# Patient Record
Sex: FEMALE | Race: WHITE | NOT HISPANIC OR LATINO | Employment: UNEMPLOYED | URBAN - NONMETROPOLITAN AREA
[De-identification: names, ages, dates, MRNs, and addresses within clinical notes are randomized per-mention and may not be internally consistent; named-entity substitution may affect disease eponyms.]

---

## 2018-03-19 ENCOUNTER — TRANSCRIBE ORDERS (OUTPATIENT)
Dept: ADMINISTRATIVE | Facility: HOSPITAL | Age: 38
End: 2018-03-19

## 2018-03-19 DIAGNOSIS — N39.9 DISORDER OF URINARY SYSTEM: Primary | ICD-10-CM

## 2018-05-02 ENCOUNTER — TRANSCRIBE ORDERS (OUTPATIENT)
Dept: ADMINISTRATIVE | Facility: HOSPITAL | Age: 38
End: 2018-05-02

## 2018-05-02 ENCOUNTER — HOSPITAL ENCOUNTER (OUTPATIENT)
Dept: GENERAL RADIOLOGY | Facility: HOSPITAL | Age: 38
Discharge: HOME | End: 2018-05-02
Admitting: NURSE PRACTITIONER

## 2018-05-02 DIAGNOSIS — M54.12 RADICULOPATHY OF CERVICAL SPINE: ICD-10-CM

## 2018-05-02 DIAGNOSIS — M54.12 RADICULOPATHY OF CERVICAL SPINE: Primary | ICD-10-CM

## 2018-05-02 PROCEDURE — 72050 X-RAY EXAM NECK SPINE 4/5VWS: CPT

## 2018-09-17 ENCOUNTER — HOSPITAL ENCOUNTER (OUTPATIENT)
Dept: CARDIOLOGY | Facility: HOSPITAL | Age: 38
Discharge: HOME | End: 2018-09-17
Attending: FAMILY MEDICINE | Admitting: FAMILY MEDICINE

## 2018-09-17 ENCOUNTER — TRANSCRIBE ORDERS (OUTPATIENT)
Dept: ADMINISTRATIVE | Facility: HOSPITAL | Age: 38
End: 2018-09-17

## 2018-09-17 ENCOUNTER — HOSPITAL ENCOUNTER (OUTPATIENT)
Dept: GENERAL RADIOLOGY | Facility: HOSPITAL | Age: 38
Discharge: HOME | End: 2018-09-17
Attending: FAMILY MEDICINE

## 2018-09-17 ENCOUNTER — HOSPITAL ENCOUNTER (OUTPATIENT)
Dept: PREOP | Facility: HOSPITAL | Age: 38
Discharge: HOME | End: 2018-09-17

## 2018-09-17 VITALS
WEIGHT: 128 LBS | DIASTOLIC BLOOD PRESSURE: 69 MMHG | SYSTOLIC BLOOD PRESSURE: 107 MMHG | BODY MASS INDEX: 21.33 KG/M2 | HEIGHT: 65 IN

## 2018-09-17 DIAGNOSIS — R53.83 TIREDNESS: ICD-10-CM

## 2018-09-17 DIAGNOSIS — R00.2 PALPITATIONS: Primary | ICD-10-CM

## 2018-09-17 DIAGNOSIS — R10.30 LOWER ABDOMINAL PAIN: ICD-10-CM

## 2018-09-17 DIAGNOSIS — R00.2 PALPITATIONS: ICD-10-CM

## 2018-09-17 LAB
BH CV ECHO MEAS - AO MAX PG (FULL): 1.8 MMHG
BH CV ECHO MEAS - AO MAX PG: 7 MMHG
BH CV ECHO MEAS - AO MEAN PG (FULL): 1 MMHG
BH CV ECHO MEAS - AO MEAN PG: 4 MMHG
BH CV ECHO MEAS - AO ROOT AREA (BSA CORRECTED): 1.3
BH CV ECHO MEAS - AO ROOT AREA: 3.5 CM^2
BH CV ECHO MEAS - AO ROOT DIAM: 2.1 CM
BH CV ECHO MEAS - AO V2 MAX: 132 CM/SEC
BH CV ECHO MEAS - AO V2 MEAN: 99.1 CM/SEC
BH CV ECHO MEAS - AO V2 VTI: 30 CM
BH CV ECHO MEAS - AVA(I,A): 2.6 CM^2
BH CV ECHO MEAS - AVA(I,D): 2.6 CM^2
BH CV ECHO MEAS - AVA(V,A): 2.7 CM^2
BH CV ECHO MEAS - AVA(V,D): 2.7 CM^2
BH CV ECHO MEAS - BSA(HAYCOCK): 1.6 M^2
BH CV ECHO MEAS - BSA: 1.6 M^2
BH CV ECHO MEAS - BZI_BMI: 21.3 KILOGRAMS/M^2
BH CV ECHO MEAS - BZI_METRIC_HEIGHT: 165.1 CM
BH CV ECHO MEAS - BZI_METRIC_WEIGHT: 58.1 KG
BH CV ECHO MEAS - EDV(CUBED): 96.1 ML
BH CV ECHO MEAS - EDV(MOD-SP4): 55.1 ML
BH CV ECHO MEAS - EDV(TEICH): 96.3 ML
BH CV ECHO MEAS - EF(CUBED): 77.4 %
BH CV ECHO MEAS - EF(MOD-BP): 63 %
BH CV ECHO MEAS - EF(MOD-SP4): 63.2 %
BH CV ECHO MEAS - EF(TEICH): 69.6 %
BH CV ECHO MEAS - ESV(CUBED): 21.7 ML
BH CV ECHO MEAS - ESV(MOD-SP4): 20.3 ML
BH CV ECHO MEAS - ESV(TEICH): 29.3 ML
BH CV ECHO MEAS - FS: 39.1 %
BH CV ECHO MEAS - IVS/LVPW: 0.93
BH CV ECHO MEAS - IVSD: 0.63 CM
BH CV ECHO MEAS - LA DIMENSION: 2.9 CM
BH CV ECHO MEAS - LA/AO: 1.4
BH CV ECHO MEAS - LAT PEAK E' VEL: 14.6 CM/SEC
BH CV ECHO MEAS - LV DIASTOLIC VOL/BSA (35-75): 33.7 ML/M^2
BH CV ECHO MEAS - LV MASS(C)D: 90.2 GRAMS
BH CV ECHO MEAS - LV MASS(C)DI: 55.1 GRAMS/M^2
BH CV ECHO MEAS - LV MAX PG: 5.2 MMHG
BH CV ECHO MEAS - LV MEAN PG: 3 MMHG
BH CV ECHO MEAS - LV SYSTOLIC VOL/BSA (12-30): 12.4 ML/M^2
BH CV ECHO MEAS - LV V1 MAX: 114 CM/SEC
BH CV ECHO MEAS - LV V1 MEAN: 77.3 CM/SEC
BH CV ECHO MEAS - LV V1 VTI: 25 CM
BH CV ECHO MEAS - LVIDD: 4.6 CM
BH CV ECHO MEAS - LVIDS: 2.8 CM
BH CV ECHO MEAS - LVLD AP4: 7.4 CM
BH CV ECHO MEAS - LVLS AP4: 5.6 CM
BH CV ECHO MEAS - LVOT AREA (M): 3.1 CM^2
BH CV ECHO MEAS - LVOT AREA: 3.1 CM^2
BH CV ECHO MEAS - LVOT DIAM: 2 CM
BH CV ECHO MEAS - LVPWD: 0.67 CM
BH CV ECHO MEAS - MED PEAK E' VEL: 13.9 CM/SEC
BH CV ECHO MEAS - MV A MAX VEL: 38.8 CM/SEC
BH CV ECHO MEAS - MV DEC TIME: 0.25 SEC
BH CV ECHO MEAS - MV E MAX VEL: 82 CM/SEC
BH CV ECHO MEAS - MV E/A: 2.1
BH CV ECHO MEAS - PA MAX PG: 3.1 MMHG
BH CV ECHO MEAS - PA V2 MAX: 88.3 CM/SEC
BH CV ECHO MEAS - RAP SYSTOLE: 5 MMHG
BH CV ECHO MEAS - RVSP: 24.5 MMHG
BH CV ECHO MEAS - SI(AO): 63.5 ML/M^2
BH CV ECHO MEAS - SI(CUBED): 45.4 ML/M^2
BH CV ECHO MEAS - SI(LVOT): 48 ML/M^2
BH CV ECHO MEAS - SI(MOD-SP4): 21.3 ML/M^2
BH CV ECHO MEAS - SI(TEICH): 41 ML/M^2
BH CV ECHO MEAS - SV(AO): 103.9 ML
BH CV ECHO MEAS - SV(CUBED): 74.4 ML
BH CV ECHO MEAS - SV(LVOT): 78.5 ML
BH CV ECHO MEAS - SV(MOD-SP4): 34.8 ML
BH CV ECHO MEAS - SV(TEICH): 67.1 ML
BH CV ECHO MEAS - TR MAX VEL: 221 CM/SEC
BH CV ECHO MEASUREMENTS AVERAGE E/E' RATIO: 5.75
LEFT ATRIUM VOLUME INDEX: 15.5 ML/M2
LEFT ATRIUM VOLUME: 25.5 CM3
MAXIMAL PREDICTED HEART RATE: 182 BPM
STRESS TARGET HR: 155 BPM

## 2018-09-17 PROCEDURE — 51798 US URINE CAPACITY MEASURE: CPT

## 2018-09-17 PROCEDURE — 93306 TTE W/DOPPLER COMPLETE: CPT

## 2018-09-17 PROCEDURE — 71046 X-RAY EXAM CHEST 2 VIEWS: CPT

## 2018-09-17 PROCEDURE — 93306 TTE W/DOPPLER COMPLETE: CPT | Performed by: INTERNAL MEDICINE

## 2019-02-11 ENCOUNTER — TRANSCRIBE ORDERS (OUTPATIENT)
Dept: ADMINISTRATIVE | Facility: HOSPITAL | Age: 39
End: 2019-02-11

## 2019-02-11 ENCOUNTER — HOSPITAL ENCOUNTER (OUTPATIENT)
Dept: ULTRASOUND IMAGING | Facility: HOSPITAL | Age: 39
Discharge: HOME | End: 2019-02-11
Admitting: PHYSICIAN ASSISTANT

## 2019-02-11 DIAGNOSIS — M79.605 PAIN IN LEFT LEG: ICD-10-CM

## 2019-02-11 DIAGNOSIS — R22.9 LOCALIZED SWELLING, MASS AND LUMP, UNSPECIFIED: ICD-10-CM

## 2019-02-11 DIAGNOSIS — R22.9 LOCAL SUPERFICIAL SWELLING, MASS OR LUMP: ICD-10-CM

## 2019-02-11 DIAGNOSIS — R22.9 LOCALIZED SWELLING, MASS AND LUMP, UNSPECIFIED: Primary | ICD-10-CM

## 2019-02-11 PROCEDURE — 93971 EXTREMITY STUDY: CPT

## 2019-08-22 ENCOUNTER — TRANSCRIBE ORDERS (OUTPATIENT)
Dept: ADMINISTRATIVE | Facility: HOSPITAL | Age: 39
End: 2019-08-22

## 2019-08-22 ENCOUNTER — HOSPITAL ENCOUNTER (OUTPATIENT)
Dept: ULTRASOUND IMAGING | Facility: HOSPITAL | Age: 39
Discharge: HOME | End: 2019-08-22
Admitting: PHYSICIAN ASSISTANT

## 2019-08-22 DIAGNOSIS — R11.0 NAUSEA: ICD-10-CM

## 2019-08-22 DIAGNOSIS — R10.10 UPPER ABDOMINAL PAIN: Primary | ICD-10-CM

## 2019-08-22 DIAGNOSIS — R10.10 UPPER ABDOMINAL PAIN: ICD-10-CM

## 2019-08-22 PROCEDURE — 76705 ECHO EXAM OF ABDOMEN: CPT

## 2019-10-24 ENCOUNTER — TRANSCRIBE ORDERS (OUTPATIENT)
Dept: ADMINISTRATIVE | Facility: HOSPITAL | Age: 39
End: 2019-10-24

## 2019-10-24 DIAGNOSIS — M54.2 CERVICALGIA: Primary | ICD-10-CM

## 2019-10-31 ENCOUNTER — HOSPITAL ENCOUNTER (OUTPATIENT)
Dept: MRI IMAGING | Facility: HOSPITAL | Age: 39
Discharge: HOME | End: 2019-10-31
Admitting: NURSE PRACTITIONER

## 2019-10-31 DIAGNOSIS — M54.2 CERVICALGIA: ICD-10-CM

## 2019-10-31 PROCEDURE — 72141 MRI NECK SPINE W/O DYE: CPT

## 2019-11-12 ENCOUNTER — HOSPITAL ENCOUNTER (OUTPATIENT)
Age: 39
Setting detail: OUTPATIENT SURGERY
Discharge: HOME | End: 2019-11-12
Attending: PHYSICAL MEDICINE & REHABILITATION | Admitting: PHYSICAL MEDICINE & REHABILITATION

## 2019-11-12 ENCOUNTER — HOSPITAL ENCOUNTER (OUTPATIENT)
Dept: GENERAL RADIOLOGY | Age: 39
Discharge: HOME | End: 2019-11-12

## 2019-11-12 VITALS
DIASTOLIC BLOOD PRESSURE: 62 MMHG | SYSTOLIC BLOOD PRESSURE: 100 MMHG | RESPIRATION RATE: 16 BRPM | OXYGEN SATURATION: 98 % | HEART RATE: 78 BPM

## 2019-11-12 DIAGNOSIS — L90.5 SCAR PAINFUL: ICD-10-CM

## 2019-11-12 DIAGNOSIS — R52 SCAR PAINFUL: ICD-10-CM

## 2019-11-12 PROCEDURE — 62321 NJX INTERLAMINAR CRV/THRC: CPT

## 2019-11-12 PROCEDURE — G8918 PT W/O PREOP ORDER IV AB PRO: HCPCS

## 2019-11-12 PROCEDURE — G8907 PT DOC NO EVENTS ON DISCHARG: HCPCS

## 2019-11-12 PROCEDURE — 3209999900 FLUORO FOR SURGICAL PROCEDURES

## 2019-11-12 RX ORDER — METHYLPREDNISOLONE ACETATE 80 MG/ML
INJECTION, SUSPENSION INTRA-ARTICULAR; INTRALESIONAL; INTRAMUSCULAR; SOFT TISSUE PRN
Status: DISCONTINUED | OUTPATIENT
Start: 2019-11-12 | End: 2019-11-12 | Stop reason: ALTCHOICE

## 2019-11-12 RX ORDER — SODIUM CHLORIDE 9 MG/ML
INJECTION INTRAVENOUS PRN
Status: DISCONTINUED | OUTPATIENT
Start: 2019-11-12 | End: 2019-11-12 | Stop reason: ALTCHOICE

## 2019-11-12 RX ORDER — CYANOCOBALAMIN 1000 UG/ML
1000 INJECTION INTRAMUSCULAR; SUBCUTANEOUS
COMMUNITY

## 2019-11-12 RX ORDER — LIDOCAINE HYDROCHLORIDE 10 MG/ML
INJECTION, SOLUTION INFILTRATION; PERINEURAL PRN
Status: DISCONTINUED | OUTPATIENT
Start: 2019-11-12 | End: 2019-11-12 | Stop reason: ALTCHOICE

## 2019-11-12 RX ORDER — BUTALBITAL, ACETAMINOPHEN AND CAFFEINE 50; 325; 40 MG/1; MG/1; MG/1
1 TABLET ORAL EVERY 4 HOURS PRN
COMMUNITY
End: 2020-05-27 | Stop reason: ALTCHOICE

## 2019-11-12 ASSESSMENT — PAIN SCALES - GENERAL
PAINLEVEL_OUTOF10: 0
PAINLEVEL_OUTOF10: 0

## 2019-12-13 ENCOUNTER — HOSPITAL ENCOUNTER (OUTPATIENT)
Dept: NON INVASIVE DIAGNOSTICS | Age: 39
Discharge: HOME | End: 2019-12-13
Payer: COMMERCIAL

## 2019-12-13 PROCEDURE — 93229 REMOTE 30 DAY ECG TECH SUPP: CPT

## 2019-12-17 ENCOUNTER — HOSPITAL ENCOUNTER (OUTPATIENT)
Dept: GENERAL RADIOLOGY | Age: 39
Discharge: HOME | End: 2019-12-17
Payer: COMMERCIAL

## 2019-12-17 ENCOUNTER — HOSPITAL ENCOUNTER (OUTPATIENT)
Age: 39
Setting detail: OUTPATIENT SURGERY
Discharge: HOME | End: 2019-12-17
Attending: PHYSICAL MEDICINE & REHABILITATION | Admitting: PHYSICAL MEDICINE & REHABILITATION

## 2019-12-17 VITALS
HEART RATE: 74 BPM | DIASTOLIC BLOOD PRESSURE: 69 MMHG | RESPIRATION RATE: 20 BRPM | SYSTOLIC BLOOD PRESSURE: 103 MMHG | OXYGEN SATURATION: 97 %

## 2019-12-17 DIAGNOSIS — M54.2 NECK PAIN: ICD-10-CM

## 2019-12-17 PROCEDURE — 3209999900 FLUORO FOR SURGICAL PROCEDURES

## 2019-12-17 PROCEDURE — 62321 NJX INTERLAMINAR CRV/THRC: CPT

## 2019-12-17 RX ORDER — SODIUM CHLORIDE 9 MG/ML
INJECTION INTRAVENOUS PRN
Status: DISCONTINUED | OUTPATIENT
Start: 2019-12-17 | End: 2019-12-17 | Stop reason: ALTCHOICE

## 2019-12-17 RX ORDER — LIDOCAINE HYDROCHLORIDE 10 MG/ML
INJECTION, SOLUTION INFILTRATION; PERINEURAL PRN
Status: DISCONTINUED | OUTPATIENT
Start: 2019-12-17 | End: 2019-12-17 | Stop reason: ALTCHOICE

## 2019-12-17 RX ORDER — METHYLPREDNISOLONE ACETATE 80 MG/ML
INJECTION, SUSPENSION INTRA-ARTICULAR; INTRALESIONAL; INTRAMUSCULAR; SOFT TISSUE PRN
Status: DISCONTINUED | OUTPATIENT
Start: 2019-12-17 | End: 2019-12-17 | Stop reason: ALTCHOICE

## 2020-03-17 ENCOUNTER — HOSPITAL ENCOUNTER (OUTPATIENT)
Dept: GENERAL RADIOLOGY | Age: 40
Discharge: HOME | End: 2020-03-17
Payer: COMMERCIAL

## 2020-03-17 ENCOUNTER — HOSPITAL ENCOUNTER (OUTPATIENT)
Age: 40
Setting detail: OUTPATIENT SURGERY
Discharge: HOME | End: 2020-03-17
Attending: PHYSICAL MEDICINE & REHABILITATION | Admitting: PHYSICAL MEDICINE & REHABILITATION

## 2020-03-17 VITALS
OXYGEN SATURATION: 98 % | DIASTOLIC BLOOD PRESSURE: 61 MMHG | RESPIRATION RATE: 20 BRPM | HEART RATE: 68 BPM | SYSTOLIC BLOOD PRESSURE: 99 MMHG

## 2020-03-17 PROCEDURE — 3209999900 FLUORO FOR SURGICAL PROCEDURES

## 2020-03-17 PROCEDURE — 62321 NJX INTERLAMINAR CRV/THRC: CPT

## 2020-03-17 RX ORDER — METHYLPREDNISOLONE ACETATE 80 MG/ML
INJECTION, SUSPENSION INTRA-ARTICULAR; INTRALESIONAL; INTRAMUSCULAR; SOFT TISSUE PRN
Status: DISCONTINUED | OUTPATIENT
Start: 2020-03-17 | End: 2020-03-17 | Stop reason: ALTCHOICE

## 2020-03-17 RX ORDER — SODIUM CHLORIDE 9 MG/ML
INJECTION INTRAVENOUS PRN
Status: DISCONTINUED | OUTPATIENT
Start: 2020-03-17 | End: 2020-03-17 | Stop reason: ALTCHOICE

## 2020-03-17 RX ORDER — LIDOCAINE HYDROCHLORIDE 10 MG/ML
INJECTION, SOLUTION INFILTRATION; PERINEURAL PRN
Status: DISCONTINUED | OUTPATIENT
Start: 2020-03-17 | End: 2020-03-17 | Stop reason: ALTCHOICE

## 2020-03-17 NOTE — INTERVAL H&P NOTE
H&P Update     Patient examined. There has been no change.     Electronically signed by Keke Rivera on 3/17/20 at 9:34 AM CDT

## 2020-05-27 ENCOUNTER — OFFICE VISIT (OUTPATIENT)
Dept: NEUROSURGERY | Age: 40
End: 2020-05-27
Payer: COMMERCIAL

## 2020-05-27 VITALS
SYSTOLIC BLOOD PRESSURE: 103 MMHG | HEIGHT: 65 IN | HEART RATE: 91 BPM | BODY MASS INDEX: 21.66 KG/M2 | DIASTOLIC BLOOD PRESSURE: 63 MMHG | WEIGHT: 130 LBS

## 2020-05-27 PROCEDURE — 99204 OFFICE O/P NEW MOD 45 MIN: CPT | Performed by: NURSE PRACTITIONER

## 2020-05-27 RX ORDER — RIZATRIPTAN BENZOATE 10 MG/1
TABLET, ORALLY DISINTEGRATING ORAL
COMMUNITY
Start: 2020-05-05

## 2020-05-27 RX ORDER — NORETHINDRONE ACETATE AND ETHINYL ESTRADIOL 1MG-20(21)
1 KIT ORAL DAILY
COMMUNITY

## 2020-05-27 RX ORDER — TIZANIDINE 4 MG/1
TABLET ORAL
COMMUNITY
Start: 2020-05-05

## 2020-05-27 NOTE — PROGRESS NOTES
[facility] Neurology Office Note      Patient:   Anabell Woodall  MR#:    [id number]  Account Number:                         YOB: 1980  Date of Evaluation:  5/27/2020  Time of Note:                          3:03 PM  Primary/Referring Physician:  Rita Ewing DO   Consulting Physician:  PRAMOD Olivares    NEW PATIENT CONSULTATION    Chief Complaint   Patient presents with    New Patient     Referred by Umang Agee for neck pain and headaches. Patient states it has been going on for several years. HISTORY OF PRESENT ILLNESS    Anabell Woodall is a 44y.o. year old female here for evaluation of headaches and neck pain. Headache pain is posterior with radiation forward/retro orbital. Primarily right sided pain. She does note quite a bit of right shoulder pain as well with headaches. Prior to headache she notes that her right eye feels dry and closes her eye more slowly. She denies visual changes or visual loss. She notes light/sound sensitivity with headaches, some mild nausea noted as well. She does note worsening headaches around her menstrual cycle. If she takes Maxalt early on in the headache this will typically alleviate the pain. She has tried Fioricet with mild improvement. Applying ice to the neck will help some headaches. No prior preventatives. She is noting 5-8 migraines in a month. She has a long history of neck pain, at times pain will radiate into the right arm. No overt weakness. She follows with Dr. Terell Munoz as well and has tried ONBs, TPIs without much improvement. She has had 2 ESIs with Dr. Terell Munoz as well. She did note some improvement after SELIN injections. She has tried physical therapy for neck pain several years ago and this was beneficial at that time. No other complaints today.      Past Medical History:   Diagnosis Date    Headache     Hyperlipidemia     Mitral valve prolapse        Past Surgical History:   Procedure Laterality Date    Sig Dispense Refill    norethindrone-ethinyl estradiol (JUNEL FE 1/20) 1-20 MG-MCG per tablet Take 1 tablet by mouth daily      rizatriptan (MAXALT-MLT) 10 MG disintegrating tablet       tiZANidine (ZANAFLEX) 4 MG tablet       diclofenac sodium (VOLTAREN) 1 % GEL       Levothyroxine Sodium (SYNTHROID PO) Take by mouth      cyanocobalamin 1000 MCG/ML injection Inject 1,000 mcg into the muscle every 30 days       ALPRAZolam (XANAX PO) Take 0.25 mg by mouth as needed       busPIRone HCl (BUSPAR PO) Take 10 mg by mouth 3 times daily       Metaxalone (SKELAXIN PO) Take by mouth daily        No current facility-administered medications for this visit. No Known Allergies    REVIEW OF SYSTEMS  Constitutional: []? Fever []? Sweat []? Chills []? Recent Injury [x]? Denies all unless marked  HEENT:[]? Headache  []? Head Injury/Hearing Loss  []? Sore Throat  []? Ear Ache/Dizziness  [x]? Denies all unless marked  Spine:  []? Neck pain  []? Back pain  []? Sciaticia  [x]? Denies all unless marked  Cardiovascular:[]? Heart Disease []? Chest Pain []? Palpitations  [x]? Denies all unless marked  Pulmonary: []? Shortness of Breath []? Cough   [x]? Denies all unless marke  Gastrointestinal: []? Nausea  []? Vomiting  []? Abdominal Pain  []? Constipation  []? Diarrhea  []? Dark Bloody Stools  [x]? Denies all unless marked  Psychiatric/Behavioral:[]? Depression []? Anxiety [x]? Denies all unless marked  Genitourinary:   []? Frequency  []? Urgency  []? Incontinence []? Pain with Urination  [x]? Denies all unless marked  Extremities: []? Pain  []? Swelling  [x]? Denies all unless marked  Musculoskeletal: []? Muscle Pain  []? Joint Pain  []? Arthritis []? Muscle Cramps []? Muscle Twitches  [x]? Denies all unless marked  Sleep: []? Insomnia []? Snoring []? Restless Legs []? Sleep Apnea  []? Daytime Sleepiness  [x]? Denies all unless marked  Skin:[]? Rash []? Skin Discoloration [x]? Denies all unless marked   Neurological: []? Visual

## 2020-06-15 ENCOUNTER — HOSPITAL ENCOUNTER (OUTPATIENT)
Dept: MRI IMAGING | Age: 40
Discharge: HOME | End: 2020-06-15
Payer: COMMERCIAL

## 2020-06-15 PROCEDURE — A9577 INJ MULTIHANCE: HCPCS | Performed by: NURSE PRACTITIONER

## 2020-06-15 PROCEDURE — 70553 MRI BRAIN STEM W/O & W/DYE: CPT

## 2020-06-15 PROCEDURE — 6360000004 HC RX CONTRAST MEDICATION: Performed by: NURSE PRACTITIONER

## 2020-06-15 RX ADMIN — GADOBENATE DIMEGLUMINE 10 ML: 529 INJECTION, SOLUTION INTRAVENOUS at 08:56

## 2020-06-22 ENCOUNTER — TELEPHONE (OUTPATIENT)
Dept: NEUROSURGERY | Age: 40
End: 2020-06-22

## 2020-10-06 ENCOUNTER — HOSPITAL ENCOUNTER (OUTPATIENT)
Dept: PAIN MANAGEMENT | Age: 40
Discharge: HOME | End: 2020-10-06
Payer: COMMERCIAL

## 2020-10-06 VITALS
TEMPERATURE: 97.1 F | OXYGEN SATURATION: 98 % | RESPIRATION RATE: 18 BRPM | DIASTOLIC BLOOD PRESSURE: 65 MMHG | SYSTOLIC BLOOD PRESSURE: 93 MMHG | HEART RATE: 57 BPM

## 2020-10-06 PROCEDURE — 6360000002 HC RX W HCPCS

## 2020-10-06 PROCEDURE — 2580000003 HC RX 258

## 2020-10-06 PROCEDURE — 62321 NJX INTERLAMINAR CRV/THRC: CPT

## 2020-10-06 PROCEDURE — 2500000003 HC RX 250 WO HCPCS

## 2020-10-06 PROCEDURE — 3209999900 FLUORO FOR SURGICAL PROCEDURES

## 2020-10-06 RX ORDER — SODIUM CHLORIDE 9 MG/ML
INJECTION INTRAVENOUS
Status: COMPLETED | OUTPATIENT
Start: 2020-10-06 | End: 2020-10-06

## 2020-10-06 RX ORDER — METHYLPREDNISOLONE ACETATE 80 MG/ML
INJECTION, SUSPENSION INTRA-ARTICULAR; INTRALESIONAL; INTRAMUSCULAR; SOFT TISSUE
Status: COMPLETED | OUTPATIENT
Start: 2020-10-06 | End: 2020-10-06

## 2020-10-06 RX ORDER — LIDOCAINE HYDROCHLORIDE 10 MG/ML
INJECTION, SOLUTION EPIDURAL; INFILTRATION; INTRACAUDAL; PERINEURAL
Status: COMPLETED | OUTPATIENT
Start: 2020-10-06 | End: 2020-10-06

## 2020-10-06 RX ADMIN — SODIUM CHLORIDE 5 ML: 9 INJECTION INTRAVENOUS at 09:03

## 2020-10-06 RX ADMIN — METHYLPREDNISOLONE ACETATE 80 MG: 80 INJECTION, SUSPENSION INTRA-ARTICULAR; INTRALESIONAL; INTRAMUSCULAR; SOFT TISSUE at 09:03

## 2020-10-06 RX ADMIN — LIDOCAINE HYDROCHLORIDE 5 ML: 10 INJECTION, SOLUTION EPIDURAL; INFILTRATION; INTRACAUDAL; PERINEURAL at 09:02

## 2020-10-06 ASSESSMENT — PAIN DESCRIPTION - ONSET: ONSET: AWAKENED FROM SLEEP

## 2020-10-06 ASSESSMENT — PAIN DESCRIPTION - DIRECTION: RADIATING_TOWARDS: RUE

## 2020-10-06 ASSESSMENT — PAIN DESCRIPTION - PAIN TYPE: TYPE: CHRONIC PAIN

## 2020-10-06 ASSESSMENT — PAIN DESCRIPTION - DESCRIPTORS: DESCRIPTORS: BURNING;RADIATING

## 2020-10-06 ASSESSMENT — PAIN - FUNCTIONAL ASSESSMENT: PAIN_FUNCTIONAL_ASSESSMENT: PREVENTS OR INTERFERES SOME ACTIVE ACTIVITIES AND ADLS

## 2020-10-06 ASSESSMENT — PAIN DESCRIPTION - PROGRESSION: CLINICAL_PROGRESSION: GRADUALLY WORSENING

## 2020-10-06 ASSESSMENT — PAIN DESCRIPTION - ORIENTATION: ORIENTATION: RIGHT

## 2020-10-06 ASSESSMENT — PAIN SCALES - GENERAL: PAINLEVEL_OUTOF10: 4

## 2020-10-06 ASSESSMENT — PAIN DESCRIPTION - LOCATION: LOCATION: NECK;SHOULDER;ARM

## 2020-10-06 ASSESSMENT — PAIN DESCRIPTION - FREQUENCY: FREQUENCY: CONTINUOUS

## 2020-10-06 NOTE — INTERVAL H&P NOTE
Update History & Physical    The patient's History and Physical was reviewed with the patient and I examined the patient. There was  NO CHANGE:[id number]}. The surgical site was confirmed by the patient and me. Plan: The risks, benefits, expected outcome, and alternative to the recommended procedure have been discussed with the patient. Patient understands and wants to proceed with the procedure.      Electronically signed by Seth Mak MD on 10/6/2020 at 8:59 AM

## 2021-11-12 ENCOUNTER — TRANSCRIBE ORDERS (OUTPATIENT)
Dept: ADMINISTRATIVE | Facility: HOSPITAL | Age: 41
End: 2021-11-12

## 2021-11-12 ENCOUNTER — HOSPITAL ENCOUNTER (OUTPATIENT)
Dept: ULTRASOUND IMAGING | Facility: HOSPITAL | Age: 41
Discharge: HOME | End: 2021-11-12
Admitting: FAMILY MEDICINE

## 2021-11-12 DIAGNOSIS — E03.9 HYPOTHYROIDISM, UNSPECIFIED TYPE: ICD-10-CM

## 2021-11-12 DIAGNOSIS — R94.6 ABNORMAL RESULTS OF THYROID FUNCTION STUDIES: Primary | ICD-10-CM

## 2021-11-12 PROCEDURE — 76536 US EXAM OF HEAD AND NECK: CPT

## 2021-12-05 PROCEDURE — 87635 SARS-COV-2 COVID-19 AMP PRB: CPT | Performed by: NURSE PRACTITIONER

## 2022-10-26 ENCOUNTER — TRANSCRIBE ORDERS (OUTPATIENT)
Dept: ADMINISTRATIVE | Facility: HOSPITAL | Age: 42
End: 2022-10-26

## 2022-10-26 DIAGNOSIS — I34.89 OTHER NONRHEUMATIC MITRAL VALVE DISORDERS: Primary | ICD-10-CM

## 2022-10-26 DIAGNOSIS — Q79.60 EHLERS-DANLOS SYNDROME, UNSPECIFIED: ICD-10-CM

## 2022-11-15 ENCOUNTER — TELEPHONE (OUTPATIENT)
Dept: NEUROSURGERY | Age: 42
End: 2022-11-15

## 2022-11-15 DIAGNOSIS — M54.2 NECK PAIN: Primary | ICD-10-CM

## 2022-11-15 NOTE — TELEPHONE ENCOUNTER
[facility] New Patient Questionnaire    Diagnosis/Reason for Referral?  M54.2 (ICD-10-CM) - Cervicalgia   M54.12 (ICD-10-CM) - Radiculopathy, cervical region          2. Who is completing questionnaire? Patient X Caregiver Family      3. Has the patient had any previous spinal/brain surgeries? NO      A. If yes, what is the name of the facility in which the surgery was performed? B. Procedure/Surgery performed? C. Who was the surgeon? D. When was the surgery? MM/YY       E. Did the patient improve after the surgery? 4. Is this a second opinion? If yes, Dr. Wanda Herring would like to review patient first before making the appointment. 5. Have MRI Images been obtain within the last year? Yes   No X INSTRUCTED TO COMPLETE XR      XR  CT     If yes, where was the imaging performed? If yes, what part of the body? Lumbar  Cervical  Thoracic  Brain     If yes, when was it obtained? MM/YY    Note: if the scan was performed at a facility other than [address] [place] [address], the disc will need to be brought to the appointment or we need to reach out to obtain the disc. A. Was the patient instructed to provide the disc? Yes   No X      8. Has the patient had a NCV/EMG within the last year? Yes  No X     If yes, where was it performed and date? MM/YY  Location:      9. Has the patient been to Physical Therapy? Yes X  No     If yes, what location, how long attended, and last visit? Location: [facility]       Therapy Lasted:    Date of Last Visit: 2021      10. Has the patient been to Pain Management? Yes X No  DRY NEEDLING     If yes, what location and last visit     Location:    Last Visit:   Is it helping?

## 2022-11-23 ENCOUNTER — HOSPITAL ENCOUNTER (OUTPATIENT)
Dept: GENERAL RADIOLOGY | Age: 42
Discharge: HOME | End: 2022-11-23
Payer: COMMERCIAL

## 2022-11-23 ENCOUNTER — OFFICE VISIT (OUTPATIENT)
Dept: NEUROSURGERY | Age: 42
End: 2022-11-23
Payer: COMMERCIAL

## 2022-11-23 VITALS
DIASTOLIC BLOOD PRESSURE: 72 MMHG | RESPIRATION RATE: 18 BRPM | SYSTOLIC BLOOD PRESSURE: 102 MMHG | WEIGHT: 150 LBS | HEART RATE: 92 BPM | HEIGHT: 65 IN | BODY MASS INDEX: 24.99 KG/M2 | OXYGEN SATURATION: 98 %

## 2022-11-23 DIAGNOSIS — R20.0 HAND NUMBNESS: ICD-10-CM

## 2022-11-23 DIAGNOSIS — M79.602 BILATERAL ARM PAIN: ICD-10-CM

## 2022-11-23 DIAGNOSIS — M54.2 NECK PAIN: ICD-10-CM

## 2022-11-23 DIAGNOSIS — M50.30 DDD (DEGENERATIVE DISC DISEASE), CERVICAL: Primary | ICD-10-CM

## 2022-11-23 DIAGNOSIS — M40.292 OTHER KYPHOSIS OF CERVICAL REGION: ICD-10-CM

## 2022-11-23 DIAGNOSIS — M79.601 BILATERAL ARM PAIN: ICD-10-CM

## 2022-11-23 PROCEDURE — 72040 X-RAY EXAM NECK SPINE 2-3 VW: CPT | Performed by: RADIOLOGY

## 2022-11-23 PROCEDURE — 72040 X-RAY EXAM NECK SPINE 2-3 VW: CPT

## 2022-11-23 PROCEDURE — 99204 OFFICE O/P NEW MOD 45 MIN: CPT | Performed by: NURSE PRACTITIONER

## 2022-11-23 RX ORDER — DICLOFENAC SODIUM 75 MG/1
75 TABLET, DELAYED RELEASE ORAL 2 TIMES DAILY
COMMUNITY
Start: 2022-11-14

## 2022-11-23 ASSESSMENT — ENCOUNTER SYMPTOMS
EYES NEGATIVE: 1
RESPIRATORY NEGATIVE: 1
GASTROINTESTINAL NEGATIVE: 1

## 2022-11-23 NOTE — PROGRESS NOTES
[facility]  Office Visit      Chief Complaint   Patient presents with    New Patient     Establishing care    Results     XR Cervical 11/23/22    Neck Pain     Patient states \"I have had pain for years and it is gradually worsening. \" She states she does have bulging discs in her neck. She has tried PT and dry needling which helped her for awhile. She states she has had a recent flare up which is causing her problems again. She is taking Diclofenac and Tizanidine to help manage the pain. Numbness     Patient states she does have numbness/tingling in her BUE but mainly in the right side. She states she also has a burning sensation. HISTORY OF PRESENT ILLNESS:    Mark Cortes is a 43 y.o. female who presents with neck pain and BUE numbness and paresthesias that has been present for many years, however, over the last 3 months has worsened. The pain does radiate into the BUE, R>L and the pain travels into bicep deltoid and into the forearm. She describes it as burning sensations. She also has interscapular pain. Her pain is mostly located in the arms. The patient complains of numbness of the right 4th and 5th digits. She does not have numbness in the fingertips, trouble using hands to perform fine motor tasks or ataxia. Pain will worsen with lifting or physical activity using her arms.   She has a history of significant headaches in which she did see Dr. Idania Andrea for in the past.      The patient has underwent a non-operative treatment course that has included:  NSAIDs (ibuprofen, diclofenac)  Tylenol  Muscle Relaxers (skelaxin, tizanidine)  Opiates  Oral Steroids  Physical Therapy ([place] many sessions over the last 2 years, still going)  Manual Traction  Epidural Steroid Injections (Dr. Idania Andrea)  Trigger Point  Dry needling with PT  Chiropractic Manipulation (years ago)  TENS Unit with PT dry needling made her worse      Of note she does use tobacco and does take blood thinning medications (Excedrine and ibuprfen). Past Medical History:   Diagnosis Date    Headache     Hyperlipidemia     Mitral valve prolapse        Past Surgical History:   Procedure Laterality Date    CERVICAL SPINE SURGERY N/A 11/12/2019    CERVICAL INTERLAMINAR SELIN C5-6 performed by Rita Vidal at [address] N/A 3/17/2020    CERVICAL INTERLAMINAR SELIN C6-7 performed by Ben Valdez at [address] N/A 12/17/2019    EPIDURAL STEROID INJECTION C6-7 performed by Ben Valdez at [address] [facility]      reconstruction, has metal in face     SINUS SURGERY         Current Outpatient Medications   Medication Sig Dispense Refill    diclofenac (VOLTAREN) 75 MG EC tablet Take 75 mg by mouth in the morning and at bedtime      norethindrone-ethinyl estradiol (JUNEL FE 1/20) 1-20 MG-MCG per tablet Take 1 tablet by mouth daily      rizatriptan (MAXALT-MLT) 10 MG disintegrating tablet       tiZANidine (ZANAFLEX) 4 MG tablet       diclofenac sodium (VOLTAREN) 1 % GEL       Levothyroxine Sodium (SYNTHROID PO) Take by mouth      cyanocobalamin 1000 MCG/ML injection Inject 1,000 mcg into the muscle every 30 days       ALPRAZolam (XANAX PO) Take 0.25 mg by mouth as needed       busPIRone HCl (BUSPAR PO) Take 10 mg by mouth 3 times daily        No current facility-administered medications for this visit. Allergies:  Fish allergy    Social History:   Social History     Tobacco Use   Smoking Status Every Day    Packs/day: 1.00    Types: Cigarettes   Smokeless Tobacco Never     Social History     Substance and Sexual Activity   Alcohol Use None    Comment: very rare          Family History:   No family history on file. REVIEW OF SYSTEMS:  Constitutional: Negative. HENT: Negative. Eyes: Negative. Respiratory: Negative. Cardiovascular: Negative. Gastrointestinal: Negative. Genitourinary: Negative.     Musculoskeletal:  Positive for joint pain, myalgias and neck pain. Skin: Negative. Neurological:  Positive for tingling and weakness. Endo/Heme/Allergies: Negative. Psychiatric/Behavioral: Negative. PHYSICAL EXAM:  Vitals:    11/23/22 1403   BP: 102/72   Pulse: 92   Resp: 18   SpO2: 98%     Constitutional: appears well-developed and well-nourished. Eyes - conjunctiva normal.  Pupils react to light  Ear, nose, throat - hearing intact to finger rub, No scars, masses, or lesions over external nose or ears, no atrophy oftongue  Neck- symmetric, no masses noted, no jugular vein distension  Respiration- chest wall appears symmetric, good expansion, normal effort without use of accessory muscles  Musculoskeletal - no significant wasting of muscles noted, no bony deformities, gait no gross ataxia  Extremities- no clubbing, cyanosis oredema  Skin - warm, dry, and intact. No rash, erythema, or pallor. Psychiatric - mood, affect, and behavior appear normal.     Neurologic Examination  Awake, Alert and oriented x 4  Normal speech pattern, following commands    Motor:  RIGHT: hand grasp 5/5    finger extension 5/5    bicep 5/5    triceps 5/5    deltoid 5/5      LEFT:   hand grasp 5/5    finger extension 5/5    bicep 5/5    triceps 5/5    deltoid 5/5      No deficits to light touch or pinprick sensation  Reflexes are 2+ and symmetric  No clonus or Hoffmans sign  No myofacial tenderness to palpation  Normal Gait pattern        DATA and IMAGING:    Nursing/pcp notes, imaging, labs, and vitals reviewed.      PT,OT and/or speech notes reviewed    No results found for: WBC, HGB, HCT, MCV, PLTNo results found for: NA, K, CL, CO2, BUN, CREATININE, GLUCOSE, CALCIUM, PROT, LABALBU, BILITOT, ALKPHOS, AST, ALT, LABGLOM, GFRAA, AGRATIO, GLOBNo results found for: INR, PROTIME        XR Cervical Spine (11/23/2022) [facility]  I have personally reviewed these images and my interpretation is:  Mild DDD throughout, worse at C5-6, there is straightening of the cervical spine with  a very mild kyphosis at C5-6. ASSESSMENT:    Jessica Lassiter is a 43 y.o. female with complaints of chronic neck pain, BUE numbness and paresthesias. ICD-10-CM    1. DDD (degenerative disc disease), cervical  M50.30 MRI CERVICAL SPINE WO CONTRAST      2. Other kyphosis of cervical region  M40.292 MRI CERVICAL SPINE WO CONTRAST      3. Neck pain  M54.2 MRI CERVICAL SPINE WO CONTRAST      4. Bilateral arm pain  M79.601 MRI CERVICAL SPINE WO CONTRAST    M79.602       5. Hand numbness  R20.0 MRI CERVICAL SPINE WO CONTRAST          PLAN:  I have discussed and reviewed the results of the XR cervical spine with Mrs. [facility] at length. I explained that she does have significant straightening of the cervical spine along with some DDD mostly at C5-6. I did review an old MRI from 2019 that showed some mild narrowing at C5-6. Given that she has tried almost all non-operative treatments that I know to offer, I will move forward with a repeat MRI.   -Obtain MRI cervical spine  -Follow up after films         Leeanna Koenig, APRN

## 2022-11-30 ENCOUNTER — APPOINTMENT (OUTPATIENT)
Dept: CARDIOLOGY | Facility: HOSPITAL | Age: 42
End: 2022-11-30

## 2022-12-12 ENCOUNTER — HOSPITAL ENCOUNTER (OUTPATIENT)
Dept: MRI IMAGING | Age: 42
Discharge: HOME | End: 2022-12-12

## 2022-12-12 DIAGNOSIS — R20.0 HAND NUMBNESS: ICD-10-CM

## 2022-12-12 DIAGNOSIS — M79.601 BILATERAL ARM PAIN: ICD-10-CM

## 2022-12-12 DIAGNOSIS — M40.292 OTHER KYPHOSIS OF CERVICAL REGION: ICD-10-CM

## 2022-12-12 DIAGNOSIS — M79.602 BILATERAL ARM PAIN: ICD-10-CM

## 2022-12-12 DIAGNOSIS — M54.2 NECK PAIN: ICD-10-CM

## 2022-12-12 DIAGNOSIS — M50.30 DDD (DEGENERATIVE DISC DISEASE), CERVICAL: ICD-10-CM

## 2022-12-27 ENCOUNTER — TELEPHONE (OUTPATIENT)
Dept: NEUROSURGERY | Age: 42
End: 2022-12-27

## 2022-12-27 NOTE — TELEPHONE ENCOUNTER
Attempted to call patient in regards to scheduled appt with our office today to review imaging. Patient's MRI isn't scheduled until tomorrow 12/28. Left message stating we would need to rescheduled until after she completes her MRI and to call me back at [phone].

## 2022-12-28 ENCOUNTER — HOSPITAL ENCOUNTER (OUTPATIENT)
Dept: MRI IMAGING | Age: 42
Discharge: HOME | End: 2022-12-28
Payer: COMMERCIAL

## 2022-12-28 PROCEDURE — 72141 MRI NECK SPINE W/O DYE: CPT

## 2022-12-29 ENCOUNTER — OFFICE VISIT (OUTPATIENT)
Dept: NEUROSURGERY | Age: 42
End: 2022-12-29
Payer: COMMERCIAL

## 2022-12-29 VITALS
HEART RATE: 90 BPM | SYSTOLIC BLOOD PRESSURE: 100 MMHG | RESPIRATION RATE: 18 BRPM | DIASTOLIC BLOOD PRESSURE: 66 MMHG | OXYGEN SATURATION: 98 % | HEIGHT: 65 IN | WEIGHT: 150 LBS | BODY MASS INDEX: 24.99 KG/M2

## 2022-12-29 DIAGNOSIS — R20.0 PAIN AND NUMBNESS OF RIGHT UPPER EXTREMITY: ICD-10-CM

## 2022-12-29 DIAGNOSIS — M79.601 PAIN AND NUMBNESS OF RIGHT UPPER EXTREMITY: ICD-10-CM

## 2022-12-29 DIAGNOSIS — M47.22 CERVICAL SPONDYLOSIS WITH RADICULOPATHY: Primary | ICD-10-CM

## 2022-12-29 DIAGNOSIS — M54.2 NECK PAIN: ICD-10-CM

## 2022-12-29 PROCEDURE — 99213 OFFICE O/P EST LOW 20 MIN: CPT | Performed by: NEUROLOGICAL SURGERY

## 2022-12-29 ASSESSMENT — ENCOUNTER SYMPTOMS
GASTROINTESTINAL NEGATIVE: 1
RESPIRATORY NEGATIVE: 1
EYES NEGATIVE: 1

## 2022-12-29 NOTE — PROGRESS NOTES
[facility]  Office Visit      Chief Complaint   Patient presents with    Results     MRI Cervical Spine (12/28/2022)    Neck Pain     Patient states \"I have had pain for years and it is gradually worsening. \" She states she does have bulging discs in her neck. She has tried PT and dry needling which helped her for awhile. She states she has had a recent flare up which is causing her problems again. She is taking Diclofenac and Tizanidine to help manage the pain. Numbness     Patient states she does have numbness/tingling in her BUE but mainly in the right side. She states she also has a burning sensation. 12/29/2022:  Mrs. Gallo Flores is here for follow up and to discuss her MRI. She continues to have RUE pain in a similar distrubution as before. She has started undergoing PT again. She states she takes ibuprofen and uses heat/ice which helps some. HISTORY OF PRESENT ILLNESS:    Ana Amador is a 43 y.o. female who presents with neck pain and BUE numbness and paresthesias that has been present for many years, however, over the last 3 months has worsened. The pain does radiate into the BUE, R>L and the pain travels into bicep deltoid and into the forearm. She describes it as burning sensations. She also has interscapular pain. Her pain is mostly located in the arms. The patient complains of numbness of the right 4th and 5th digits. She does not have numbness in the fingertips, trouble using hands to perform fine motor tasks or ataxia. Pain will worsen with lifting or physical activity using her arms.   She has a history of significant headaches in which she did see Dr. Baldwin Klinefelter for in the past.      The patient has underwent a non-operative treatment course that has included:  NSAIDs (ibuprofen, diclofenac)  Tylenol  Muscle Relaxers (skelaxin, tizanidine)  Opiates  Oral Steroids  Physical Therapy ([place] many sessions over the last 2 years, still going)  Manual Traction  Epidural Steroid Injections (Dr. Mat Rowell)  Trigger Point  Dry needling with PT  Chiropractic Manipulation (years ago)  TENS Unit with PT dry needling made her worse      Of note she does use tobacco and does take blood thinning medications (Excedrine and ibuprfen). Past Medical History:   Diagnosis Date    Headache     Hyperlipidemia     Mitral valve prolapse        Past Surgical History:   Procedure Laterality Date    CERVICAL SPINE SURGERY N/A 11/12/2019    CERVICAL INTERLAMINAR SELIN C5-6 performed by Marlo Suazo at [address] N/A 3/17/2020    CERVICAL INTERLAMINAR SELIN C6-7 performed by Ben Valdez at [address] N/A 12/17/2019    EPIDURAL STEROID INJECTION C6-7 performed by Ben Valdez at [address]      reconstruction, has metal in face     SINUS SURGERY         Current Outpatient Medications   Medication Sig Dispense Refill    diclofenac (VOLTAREN) 75 MG EC tablet Take 75 mg by mouth in the morning and at bedtime      norethindrone-ethinyl estradiol (JUNEL FE 1/20) 1-20 MG-MCG per tablet Take 1 tablet by mouth daily      rizatriptan (MAXALT-MLT) 10 MG disintegrating tablet       tiZANidine (ZANAFLEX) 4 MG tablet       diclofenac sodium (VOLTAREN) 1 % GEL       Levothyroxine Sodium (SYNTHROID PO) Take by mouth      cyanocobalamin 1000 MCG/ML injection Inject 1,000 mcg into the muscle every 30 days       ALPRAZolam (XANAX PO) Take 0.25 mg by mouth as needed       busPIRone HCl (BUSPAR PO) Take 10 mg by mouth 3 times daily        No current facility-administered medications for this visit.        Allergies:  Fish allergy    Social History:   Social History     Tobacco Use   Smoking Status Every Day    Packs/day: 1.00    Types: Cigarettes   Smokeless Tobacco Never     Social History     Substance and Sexual Activity   Alcohol Use None    Comment: very rare          Family History:   No family history on file.    REVIEW OF SYSTEMS:  Constitutional: Negative. HENT: Negative. Eyes: Negative. Respiratory: Negative. Cardiovascular: Negative. Gastrointestinal: Negative. Genitourinary: Negative. Musculoskeletal:  Positive for joint pain, myalgias and neck pain. Skin: Negative. Neurological:  Positive for tingling and weakness. Endo/Heme/Allergies: Negative. Psychiatric/Behavioral: Negative. PHYSICAL EXAM:  Vitals:    12/29/22 1124   BP: 100/66   Pulse: 90   Resp: 18   SpO2: 98%     Constitutional: appears well-developed and well-nourished. Eyes - conjunctiva normal.  Pupils react to light  Ear, nose, throat - hearing intact to finger rub, No scars, masses, or lesions over external nose or ears, no atrophy oftongue  Neck- symmetric, no masses noted, no jugular vein distension  Respiration- chest wall appears symmetric, good expansion, normal effort without use of accessory muscles  Musculoskeletal - no significant wasting of muscles noted, no bony deformities, gait no gross ataxia  Extremities- no clubbing, cyanosis oredema  Skin - warm, dry, and intact. No rash, erythema, or pallor. Psychiatric - mood, affect, and behavior appear normal.     Neurologic Examination  Awake, Alert and oriented x 4  Normal speech pattern, following commands    Motor:  RIGHT: hand grasp 5/5    finger extension 5/5    bicep 5/5    triceps 5/5    deltoid 5/5      LEFT:   hand grasp 5/5    finger extension 5/5    bicep 5/5    triceps 5/5    deltoid 5/5    Decreased sensation distal thumb on right. Reflexes are 2+ and symmetric  No clonus or Hoffmans sign  No myofacial tenderness to palpation  Normal Gait pattern        DATA and IMAGING:    Nursing/pcp notes, imaging, labs, and vitals reviewed.      PT,OT and/or speech notes reviewed    No results found for: WBC, HGB, HCT, MCV, PLTNo results found for: NA, K, CL, CO2, BUN, CREATININE, GLUCOSE, CALCIUM, PROT, LABALBU, BILITOT, ALKPHOS, AST, ALT, LABGLOM, [place] Amend results found for: INR, PROTIME        XR Cervical Spine (11/23/2022) [facility]  I have personally reviewed these images and my interpretation is:  Mild DDD throughout, worse at C5-6, there is straightening of the cervical spine with  a very mild kyphosis at C5-6. Findings:   Examination is slightly degraded due to susceptibility artifact originating from   the oral cavity, which limits evaluation of the upper cervical spine. There is straightening of the normal cervical lordosis. The vertebral bodies   demonstrate normal height. The vertebral bodies demonstrate normal alignment. There is disc desiccation at multiple levels with mild loss of disc space height   at C5-C6. Bone marrow signal is within normal limits. The cervical spinal cord is normal in course, caliber and signal.   Paraspinal soft tissues are unremarkable. C2-C3: No significant spinal canal or neural foraminal stenosis. C3-C4: Disc osteophyte complex is present causing mild spinal canal stenosis. No   significant neural foraminal stenosis is seen. C4-C5: No significant herniation or stenosis. C5-C6: A broad-based disc protrusion eccentric to the right is present causing   mild to moderate spinal canal stenosis with contouring of the spinal cord. There   is mild right neural foraminal stenosis related to uncovertebral joint   hypertrophy. C6-C7: A central disc extrusion is present extending caudally by 4 to 5 mm,   causing mild spinal canal stenosis. No neural foraminal stenosis is seen. There   is mild bilateral facet arthropathy   C7-T1: No significant spinal canal or neural foraminal stenosis. Compared to the prior exam, there has been no significant interval change. Impression   Impression: 1. Broad-based disc protrusion at C5-C6 causing mild to moderate spinal canal   stenosis. 2.Central disc extrusion extending caudally at C6-C7, causing mild spinal canal   stenosis.    3.Mild spinal canal stenosis at C3-C4.     I have personally reviewed the images and my interpretation is: There is straightening of the cervical spine. At C5-6 there is a disc osteophyte that results in mild to moderate canal and right foraminal stenosis. C6-7 there is another mild disc osteophyte the results in mild canal stenosis. ASSESSMENT:    Jesus Morris is a 43 y.o. female with complaints of chronic neck pain, BUE numbness and paresthesias with the right side being greater than left. CLEOPATRA Healy I discussed her MRI. I explained that she does have some mild to moderate disc osteophyte complexes at C5-6 and C6-7. This does correlate somewhat with her radicular right upper extremity pain. I explained that surgery is a treatment option for her if her pain was to progress and become more constant. At this time, her pain is relatively manageable with nonoperative treatments and she would like to hold off on any surgical intervention. We discussed follow-up and she is going to call her clinic if her symptoms were to progress. ICD-10-CM    1. Cervical spondylosis with radiculopathy  M47.22       2. Neck pain  M54.2       3.  Pain and numbness of right upper extremity  M79.601     R20.0                     Jorge Luis Carreon DO

## 2023-02-21 ENCOUNTER — TELEPHONE (OUTPATIENT)
Dept: NEUROLOGY | Age: 43
End: 2023-02-21

## 2023-02-21 NOTE — TELEPHONE ENCOUNTER
So unfortunately, she has tried everything I know to offer. I do not want to deny her an appointment, however, we do not prescribe any medications other than NSAIDs and muscle relaxers in which she has already tried. She has tried all of the non-operative treatments we know to offer truly. If she does not want surgery, then I am at a loss of what to offer.   We can talk with her at an appt on T or TH

## 2023-02-21 NOTE — TELEPHONE ENCOUNTER
Patient called stating that she has been going to PT since December, and over the last three weeks she notes a \"change\" more pain in neck and elbows on fire, hands going numb. Pt states that PT is now irritating her neck more. Pt does not want to pursue surgery at this time. Do you want to order any imaging before her appointment 3/6?

## 2023-02-21 NOTE — TELEPHONE ENCOUNTER
Spoke with patient and relayed Rocio's advice. Patient states that she really just wants to try steroids again. I explained she could get these from her PCP or pain management doctor. Pt voiced understanding. She also states that her physical therapist feels a lot of her pain is coming from her being so tense and stressed so she recently has been started on a new medication for anxiety and she feels if she gets this under control her pain may improve. Patient says if things worsen she will call the office back. I voiced understanding. Upcoming appt has been canceled for now.

## 2023-05-16 ENCOUNTER — HOSPITAL ENCOUNTER (OUTPATIENT)
Dept: PAIN MANAGEMENT | Age: 43
Discharge: HOME | End: 2023-05-16
Payer: COMMERCIAL

## 2023-05-16 VITALS
TEMPERATURE: 98.3 F | SYSTOLIC BLOOD PRESSURE: 102 MMHG | HEART RATE: 65 BPM | OXYGEN SATURATION: 99 % | RESPIRATION RATE: 16 BRPM | DIASTOLIC BLOOD PRESSURE: 72 MMHG

## 2023-05-16 DIAGNOSIS — R52 PAIN MANAGEMENT: ICD-10-CM

## 2023-05-16 PROCEDURE — 62321 NJX INTERLAMINAR CRV/THRC: CPT

## 2023-05-16 PROCEDURE — 2580000003 HC RX 258

## 2023-05-16 PROCEDURE — A4216 STERILE WATER/SALINE, 10 ML: HCPCS

## 2023-05-16 PROCEDURE — 6360000002 HC RX W HCPCS

## 2023-05-16 PROCEDURE — 2500000003 HC RX 250 WO HCPCS

## 2023-05-16 RX ORDER — METHYLPREDNISOLONE ACETATE 80 MG/ML
80 INJECTION, SUSPENSION INTRA-ARTICULAR; INTRALESIONAL; INTRAMUSCULAR; SOFT TISSUE ONCE
Status: DISCONTINUED | OUTPATIENT
Start: 2023-05-16 | End: 2023-05-18 | Stop reason: HOSPADM

## 2023-05-16 RX ORDER — SODIUM CHLORIDE 9 MG/ML
5 INJECTION INTRAVENOUS ONCE
Status: DISCONTINUED | OUTPATIENT
Start: 2023-05-16 | End: 2023-05-18 | Stop reason: HOSPADM

## 2023-05-16 RX ORDER — LIDOCAINE HYDROCHLORIDE 10 MG/ML
5 INJECTION, SOLUTION EPIDURAL; INFILTRATION; INTRACAUDAL; PERINEURAL ONCE
Status: DISCONTINUED | OUTPATIENT
Start: 2023-05-16 | End: 2023-05-18 | Stop reason: HOSPADM

## 2023-05-16 ASSESSMENT — PAIN - FUNCTIONAL ASSESSMENT
PAIN_FUNCTIONAL_ASSESSMENT: PREVENTS OR INTERFERES SOME ACTIVE ACTIVITIES AND ADLS
PAIN_FUNCTIONAL_ASSESSMENT: NONE - DENIES PAIN

## 2023-05-16 ASSESSMENT — PAIN DESCRIPTION - DESCRIPTORS: DESCRIPTORS: BURNING;SHOOTING

## 2023-05-16 NOTE — PROGRESS NOTES
Procedure:  Level of Consciousness: [x]Alert [x]Oriented []Disoriented []Lethargic  Anxiety Level: [x]Calm []Anxious []Depressed []Other  Skin: []Warm [x]Dry []Cool []Moist []Intact []Other  Cardiovascular: [x]Palpitations: [x]Never []Occasionally []Frequently  Chest Pain: [x]No []Yes  Respiratory:  [x]Unlabored []Labored []Cough ([] Productive []Unproductive)  HCG Required: []No [x]Yes   Results: [x]Negative []Positive  Knowledge Level:        [x]Patient/Other verbalized understanding of pre-procedure instructions. [x]Assessment of post-op care needs (transportation, responsible caregiver)        [x]Able to discuss health care problems and how to deal with it.   Factors that Affect Teaching:        Language Barrier: [x]No []Yes - why:        Hearing Loss:        [x]No []Yes            Corrective Device:  []Yes []No        Vision Loss:           []No [x]Yes            Corrective Device:  [x]Yes []No        Memory Loss:       [x]No []Yes            []Short Term []Long Term  Motivational Level:  [x]Asks Questions                  []Extremely Anxious       [x]Seems Interested               []Seems Uninterested                  [x]Denies need for Education  Risk for Injury:  [x]Patient oriented to person, place and time  []History of frequent falls/loss of balance  Nutritional:  []Change in appetite   []Weight Gain   []Weight Loss  Functional:  []Requires assistance with ADL's

## 2023-05-16 NOTE — INTERVAL H&P NOTE
Update History & Physical    The patient's History and Physical  was reviewed with the patient and I examined the patient. There was no change. The surgical site was confirmed by the patient and me. Plan: The risks, benefits, expected outcome, and alternative to the recommended procedure have been discussed with the patient. Patient understands and wants to proceed with the procedure.      Electronically signed by Albaro Vasquze MD on 5/16/2023 at 8:54 AM

## 2023-09-06 NOTE — H&P
Dental Consult,  [facility]     Patient:   Jamilah Jiménez    Date of birth 1977   MRN:  [id number]   Date of Visit:   09/06/2023   Date of Admission 8/31/2023   Consult Requested by Edvin Price MD     I did not see the patient in-person. I reviewed the Lana Hernandez RDH note( 09/05) and Dental CT(09/01).                                       Assessment and Recommendations:   ASSESSMENT:  Jamilah Jiménez is a 45 year old female with a past medical history pertinent for HFrEF, Class III, Stage C-D, DM2, HTN, PE admitted following RHC with CI 1.61. Admitted for further evaluation and consideration for advanced heart failure therapies. Diagnosis upon admission Acute HFrEF (heart failure with reduced ejection fraction) (H) [I50.21]  Benign essential hypertension [I10]  Mixed hyperlipidemia [E78.2]  Heart failure with reduced ejection fraction, NYHA class III (H) [I50.20]  Nonischemic cardiomyopathy (H) [I42.8]  Cardiogenic shock (H) [R57.0]  Other ill-defined heart diseases [I51.89].   Dental exam pertinent for: tooth #12 is broken near the gumline, #27-F is fractured along gumline; no swelling, bleeding, or pain reported by pt. .     Periapical radiolucency on #32, #12.      RECOMMENDATION:  Due to clinical and radiographic findings, further assessment is indicated. A second examination with further imaging and any definitive treatment will occur at the [facility] dental clinic in the [facility] for Jamilah Jiménez. The medical team is responsible for establishing transportation for the patient and sending any personnel they deem necessary to monitor the patient.    __________________________________  Thank you for allowing us to participate in the care of this patient,  Direct any further questions to:     Junaid Murphy MD DDS,  PGY1  General Practice Residency  Pager: [phone]    Patient discussed with:   Jim Richmond DDS  , [facility]  See H&P in chart     Clinic information:   [facility]  [facility]  [address]  [address] [place] [address]  Phone:[phone]  Mary, Executive Office & Administrative Support phone: [phone]                                             Reason for Consult:   Referring MD & Reason for Visit: I was asked by Anthony Cruz MD, to see Jamilah Jiménez for a surgical clearance, cardio, dental consultation.                                               History of Present Illness:   This patient is a 45 year old female with a history of HFrEF, Class III, Stage C-D, DM2, HTN, PE admitted following RHC with CI 1.61. Admitted for further evaluation and consideration for advanced heart failure therapies.  Dental and oropharyngeal history is pertinent for requiring dental clearance prior to heart transplant; dental CT completed 9/1/23 (findings and impression follow); pt has established dental home. The patient reports breaking a tooth approximately one month ago while eating a pistachio (pt points to #12 - upper left premolar); there has been no pain, as this tooth has been RCT treated. Pt states that she has been trying to get scheduled with her dentist to have it removed, but has been unable to due to recent health concerns.  #27-F (lower right canine) tooth was also chipped when pt fell and hit her face - no pain or sensitivity experienced.                                                   Clinical Examination   Please see complete intra and extra oral exams on Lana Hernandez RDH's consult note on 09/05/2023.                                                        Imaging     Dental CT taken on 09/01.  Potential periradicular and/or periapical findings on: #12-upper left 1st premolar and #32-lower right 3rd molar  Retained root tips/fractured teeth #12-upper left 1st premolar  Condyles seated in fossa bilaterally, maxillary  sinuses clear bilaterally.  No osseous pathology seen.   Recent Results (from the past 48 hour(s))   MR Brain w/o Contrast    Narrative    EXAM: MR BRAIN W/O CONTRAST  9/5/2023 2:33 PM     HISTORY: Acute ischemic stroke, ICD in place       COMPARISON: CT 9/3/2023, 9/2/2023    TECHNIQUE: Sagittal T1-weighted, coronal T2-weighted, axial T2 FLAIR,  axial susceptibility weighted, and axial diffusion-weighted with ADC  map images of the brain were obtained without intravenous contrast    CONTRAST: None.    FINDINGS:  There is no mass effect, midline shift, or intracranial hemorrhage.  Scattered T2/FLAIR hyperintensities in white matter of the bilateral  cervical cortices. The ventricles are proportionate to the cerebral  sulci. Diffusion and susceptibility weighted images are negative for  acute/focal abnormality. Major intracranial vascular structures are  within normal limits.    No suspicious abnormality of the skull marrow signal. Small amount of  fluid in the mastoid air cells. Mastoid air cells are clear. No focal  abnormality of the pituitary gland, sella, skull base and upper  cervical spinal structures on sagittal images. The orbits are normal.      Impression    IMPRESSION:  1. No MRI evidence of acute intracranial pathology.  2. Nonspecific scattered T2/FLAIR hyperintensities, differential  includes sequelae of prior therapy versus sequela of infectious,  inflammatory, or vasculopathic process.    I have personally reviewed the examination and initial interpretation  and I agree with the findings.    CHRIS GERARDO MD         SYSTEM ID:  [id number]   Cardiac Device Check - Inpatient   Result Value    Implantable Pulse Generator  Medtronic    Implantable Pulse Generator Model [id number] Cobalt XT VR MRI    Implantable Pulse Generator Serial Number [id number]    Type Interrogation Session In Clinic    Clinic Name [facility]    Implantable Pulse Generator Type Defibrillator     Implantable Pulse Generator Implant Date 20221101    Implantable Lead  Medtronic    Implantable Lead Model 6935M Sprint Daniao Secure S MRI SureScan    Implantable Lead Serial Number [id number]    Implantable Lead Implant Date 20221101    Implantable Lead Polarity Type Tripolar Lead    Implantable Lead Location Detail 1 APEX    Implantable Lead Special Function 62 CM    Implantable Lead Location Right Ventricle    Joseph Setting Mode (NBG Code) VVI    Joseph Setting Lower Rate Limit 40    Joseph Setting Hysterisis Rate Off    Lead Channel Setting Sensing Polarity Bipolar    Lead Channel Setting Sensing Anode Location Right Ventricle    Lead Channel Setting Sensing Anode Terminal Ring    Lead Channel Setting Sensing Cathode Location Right Ventricle    Lead Channel Setting Sensing Cathode Terminal Tip    Lead Channel Setting Sensing Sensitivity 0.3    Lead Channel Setting Pacing Polarity Bipolar    Lead Channel Setting Pacing Anode Location Right Ventricle    Lead Channel Setting Pacing Anode Terminal Ring    Lead Channel Setting Sensing Cathode Location Right Ventricle    Lead Channel Setting Sensing Cathode Terminal Tip    Lead Channel Setting Pacing Pulse Width 0.4    Lead Channel Setting Pacing Amplitude 2.0    Lead Channel Setting Pacing Capture Mode Adaptive    Zone Setting Type Category VF    Zone Setting Detection Interval 300    Zone Setting Detection Beats Numerator 30    Zone Setting Detection Beats Denominator 40    Zone Setting Type Category VT    Zone Setting Type Category VT    Zone Setting Detection Interval 360    Zone Setting Type Category VT    Zone Setting Detection Interval 350    Lead Channel Impedance Value 551    Lead Channel Sensing Intrinsic Amplitude 20    Lead Channel Pacing Threshold Amplitude 0.5    Lead Channel Pacing Threshold Pulse Width 0.4    Battery Remaining Longevity 153    Battery Voltage 3.03    Capacitor Charge Type Shock    Capacitor Charge Type Reformation     Capacitor Charge Type FULL_ENERGY    Capacitor Charge Time 4.1    Joseph Statistic RV Percent Paced 0.1    Atrial Tachy Statistic AT/AF [place] Percent 0    Episode Statistic Recent Count 0    Episode Statistic Type Category AT/AF    Episode Statistic Recent Count 0    Episode Statistic Type Category VT    Episode Statistic Recent Count 0    Episode Statistic Type Category VF    Date Time Interrogation Session [id number]    Narrative    PURPOSE OF VISIT: CIED programming and device evaluation PRE-MRI per MD orders. Complete MRI conditional system verified. MRI checklists completed.    Device: Medtronic [id number] [place] XT VR MRI  Normal Device Function  Intrinsic Rhythm: NSR 88 bpm  = <0.1%  Thoracic Impedance: Above reference line.   Short V-V intervals: 0  Lead Trends Appear Stable: Yes  Estimated battery longevity to RRT = 12.9 years  Atrial Arrhythmia: 0  Ventricular Arrhythmia: 0  MRI Protection Mode Programmed ON  ICD tachy therapies programmed OFF  Pacing mode programmed from VVI 40 bpm to OVO.    Pt to be monitored by 4A RN during MRI scan.    Device RN: BRIDGET Hays RN    Single lead ICD    I have reviewed and interpreted the device interrogation, settings, programming and nurse's summary. The device is functioning within normal device parameters. I agree with the current findings, assessment and plan.   Cardiac Device Check - Inpatient   Result Value    Date Time Interrogation Session [id number]    Implantable Pulse Generator  Medtronic    Implantable Pulse Generator Model [id number] Cobalt XT VR MRI    Implantable Pulse Generator Serial Number [id number]    Type Interrogation Session In Clinic    Clinic Name [facility]    Implantable Pulse Generator Type Defibrillator    Implantable Pulse Generator Implant Date 20221101    Implantable Lead  Medtronic    Implantable Lead Model 6935M Sprint Quattro Secure S MRI SureScan    Implantable Lead Serial Number  [id number]    Implantable Lead Implant Date 20221101    Implantable Lead Polarity Type Tripolar Lead    Implantable Lead Location Detail 1 APEX    Implantable Lead Special Function 62 CM    Implantable Lead Location Right Ventricle    Joseph Setting Mode (NBG Code) VVI    Joseph Setting Lower Rate Limit 40    Joseph Setting Hysterisis Rate Off    Lead Channel Setting Sensing Polarity Bipolar    Lead Channel Setting Sensing Anode Location Right Ventricle    Lead Channel Setting Sensing Anode Terminal Ring    Lead Channel Setting Sensing Cathode Location Right Ventricle    Lead Channel Setting Sensing Cathode Terminal Tip    Lead Channel Setting Sensing Sensitivity 0.3    Lead Channel Setting Pacing Polarity Bipolar    Lead Channel Setting Pacing Anode Location Right Ventricle    Lead Channel Setting Pacing Anode Terminal Ring    Lead Channel Setting Sensing Cathode Location Right Ventricle    Lead Channel Setting Sensing Cathode Terminal Tip    Lead Channel Setting Pacing Pulse Width 0.4    Lead Channel Setting Pacing Amplitude 2.0    Lead Channel Setting Pacing Capture Mode Adaptive    Zone Setting Type Category VF    Zone Setting Detection Interval 300    Zone Setting Detection Beats Numerator 30    Zone Setting Detection Beats Denominator 40    Zone Setting Type Category VT    Zone Setting Type Category VT    Zone Setting Detection Interval 360    Zone Setting Type Category VT    Zone Setting Detection Interval 350    Lead Channel Impedance Value 456    Lead Channel Impedance Value 551    Lead Channel Sensing Intrinsic Amplitude 22.8    Lead Channel Pacing Threshold Amplitude 0.5    Lead Channel Pacing Threshold Pulse Width 0.4    Lead Channel Pacing Threshold Amplitude 0.75    Lead Channel Pacing Threshold Pulse Width 0.4    Battery Date Time of Measurements 84672195508884    Battery RRT Trigger 2.8 V    Battery Remaining Longevity 154    Battery Voltage 3.03    Capacitor Charge Type Shock    Capacitor Last Charge  Date Time 90464719651428    Capacitor Charge Time 4.1    Capacitor Charge Energy 18.0    Joseph Statistic Date Time Start 42253568426774    Joseph Statistic Date Time End 11815034239176    Joseph Statistic RV Percent Paced 0.1    CRT Statistic Date Time Start 92034595351777    CRT Statistic Date Time End 91160976728807    Atrial Tachy Statistic Date Time Start 33755595053816    Atrial Tachy Statistic Date Time End 22300152823809    Atrial Tachy Statistic AT/AF [place] Percent 0    Therapy Statistic Recent Shocks Delivered 0    Therapy Statistic Recent Shocks Aborted 0    Therapy Statistic Recent ATP Delivered 0    Therapy Statistic Recent Date Time Start 46564832397688    Therapy Statistic Recent Date Time End 27375315791872    Therapy Statistic Total Shocks Delivered 0    Therapy Statistic Total Shocks Aborted 0    Therapy Statistic Total ATP Delivered 0    Therapy Statistic Total  Date Time Start 21358160880689    Therapy Statistic Total  Date Time End 05298762200439    Episode Statistic Recent Count 0    Episode Statistic Type Category AT/AF    Episode Statistic Recent Count 0    Episode Statistic Type Category Patient Activated    Episode Statistic Recent Count 0    Episode Statistic Type Category SVT    Episode Statistic Recent Count 0    Episode Statistic Type Category VT    Episode Statistic Recent Count 0    Episode Statistic Type Category VF    Episode Statistic Recent Count 0    Episode Statistic Type Category VT    Episode Statistic Recent Count 0    Episode Statistic Type Category VT    Episode Statistic Recent Count 0    Episode Statistic Type Category VT    Episode Statistic Recent Date Time Start 99082172328876    Episode Statistic Recent Date Time End 28977118355679    Episode Statistic Recent Date Time Start 45940297736271    Episode Statistic Recent Date Time End 71011415330642    Episode Statistic Recent Date Time Start 57661657765496    Episode Statistic Recent Date Time End 44552622675123    Episode  Statistic Recent Date Time Start 92778896298061    Episode Statistic Recent Date Time End 41959444971530    Episode Statistic Recent Date Time Start 94717147406734    Episode Statistic Recent Date Time End 71669035550156    Episode Statistic Recent Date Time Start 88995555124832    Episode Statistic Recent Date Time End 16147578559465    Episode Statistic Recent Date Time Start 70830631404244    Episode Statistic Recent Date Time End 56508286936942    Episode Statistic Recent Date Time Start 76170831616085    Episode Statistic Recent Date Time End 83649967790216    Episode Statistic Total Count 0    Episode Statistic Type Category Patient Activated    Episode Statistic Total Count 0    Episode Statistic Type Category SVT    Episode Statistic Total Count 3    Episode Statistic Type Category VT    Episode Statistic Total Count 0    Episode Statistic Type Category VF    Episode Statistic Total Count 0    Episode Statistic Type Category VT    Episode Statistic Total Count 0    Episode Statistic Type Category VT    Episode Statistic Total Count 0    Episode Statistic Type Category VT    Episode Statistic Total Date Time Start 00255007276731    Episode Statistic Total Date Time End 69801553393967    Episode Statistic Total Date Time Start 65699364606916    Episode Statistic Total Date Time End 80673958567918    Episode Statistic Total Date Time Start 44764773523484    Episode Statistic Total Date Time End 51262276254651    Episode Statistic Total Date Time Start 97723810392214    Episode Statistic Total Date Time End 95209931373935    Episode Statistic Total Date Time Start 84796919527299    Episode Statistic Total Date Time End 06436031265326    Episode Statistic Total Date Time Start 59979347064244    Episode Statistic Total Date Time End 59280228033046    Episode Statistic Total Date Time Start 77186373366176    Episode Statistic Total Date Time End 08771655968688    Narrative    PURPOSE OF VISIT: CIED programming and  device evaluation POST MRI per MD orders.     Full ICD interrogation performed after MRI complete.  Normal Device Function  Intrinsic Rhythm: NSR @ 86 bpm  MRI Protection Mode Programmed OFF.  Pacing Mode Programmed From OVO to VVI 40 bpm.  ICD Tachy Therapies Programmed ON  Permanent Programming Changes: None    Device RN: BRIDGET Hays RN    Single lead ICD    I have reviewed and interpreted the device interrogation, settings, programming and nurse's summary. The device is functioning within normal device parameters. I agree with the current findings, assessment and plan.                                          Past Medical History      Past Medical History:   Diagnosis Date    Anxiety     Diabetes (H)     TYPE II    Hypertension        Immunization History   Administered Date(s) Administered    COVID-19 Monovalent 18+ (Moderna) 10/15/2021, 11/16/2021    Influenza (IIV3) PF 01/15/2012    Influenza Vaccine >6 months (Alfuria,Fluzone) 10/02/2019, 12/07/2020, 10/14/2021, 10/13/2022    MMR 02/02/1998    PNEUMOCOCCAL 15 VALENT CONJUGATE 07/19/2023    Pneumococcal 23 valent 11/25/2014    TDAP (Adacel,Boostrix) 01/29/2012    Td (Adult), Adsorbed 10/02/2019       Past Surgical History   Past Surgical History:   Procedure Laterality Date    APPENDECTOMY      INCIDENTAL WITH C SECTION    CV CARDIAC CATHERIZATION      CV RIGHT HEART CATH MEASUREMENTS RECORDED N/A 8/31/2023    Procedure: Heart Cath Right Heart Cath;  Surgeon: Alexander Stevens MD;  Location:  HEART CARDIAC CATH LAB    EP ICD      EYE SURGERY Left     DETACHED RETINA    GYN SURGERY      TUBAL LIGATION    ORTHOPEDIC SURGERY      SHOULDER SURGERY    ORTHOPEDIC SURGERY      KNEE ARTHROSCOPY                                           Social History      History reviewed. No pertinent family history.                                       Allergies      Allergies   Allergen Reactions    Aspirin Hives     Told nursing on 6/1/22 she breaks out in hives if she  takes aspirin.    Ibuprofen Hives    Lisinopril Cough    Oxycodone-Acetaminophen Nausea and Vomiting                                        Medications        Medications Prior to Admission   Medication Sig Dispense Refill Last Dose    atorvastatin (LIPITOR) 20 MG tablet Take 20 mg by mouth At Bedtime   8/30/2023 at 2200    benzonatate (TESSALON) 200 MG capsule Take 200 mg by mouth 3 times daily as needed for cough       dapagliflozin (FARXIGA) 10 MG TABS tablet Take 10 mg by mouth every morning   8/30/2023 at 0830    digoxin (LANOXIN) 125 MCG tablet Take 250 mcg by mouth daily       folic acid (FOLVITE) 1 MG tablet Take 1 mg by mouth daily   8/30/2023 at 0830    insulin aspart (NOVOLOG FLEXPEN) 100 UNIT/ML pen Inject 1-10 Units Subcutaneous 4 times daily (with meals and nightly) SLIDING SCALE   8/30/2023 at 1800    insulin detemir (LEVEMIR PEN) 100 UNIT/ML pen Inject 30 Units Subcutaneous At Bedtime   8/30/2023 at 2200    metolazone (ZAROXOLYN) 2.5 MG tablet Take 2.5 mg by mouth daily as needed   More than a month    nitroGLYcerin (NITROSTAT) 0.4 MG sublingual tablet Place 0.4 mg under the tongue every 5 minutes as needed for chest pain For chest pain place 1 tablet under the tongue every 5 minutes for 3 doses. If symptoms persist 5 minutes after 1st dose call 911.       potassium chloride ER (KLOR-CON M) 20 MEQ CR tablet Take 60 mEq by mouth 3 times daily   8/30/2023 at 0830    sacubitril-valsartan (ENTRESTO)  MG per tablet Take 1 tablet by mouth 2 times daily   8/30/2023 at 2200    sertraline (ZOLOFT) 100 MG tablet Take 100 mg by mouth daily       spironolactone (ALDACTONE) 25 MG tablet Take 50 mg by mouth every morning   8/30/2023 at 0830    torsemide (DEMADEX) 20 MG tablet Take 100 mg by mouth 2 times daily   8/30/2023 at 2200    Vitamin D, Cholecalciferol, 10 MCG (400 UNIT) TABS Take 400 Units by mouth daily       carvedilol (COREG) 3.125 MG tablet Take 3.125 mg by mouth 2 times daily            Current  Facility-Administered Medications Ordered in Epic   Medication Dose Route Frequency Last Rate Last Admin    acetaminophen (TYLENOL) tablet 650 mg  650 mg Oral or Feeding Tube Q4H PRN   650 mg at 09/06/23 0804    atorvastatin (LIPITOR) tablet 80 mg  80 mg Oral or Feeding Tube At Bedtime   80 mg at 09/05/23 1932    Continuing ACE inhibitor/ARB/ARNI from home medication list OR ACE inhibitor/ARB/ARNI order already placed during this visit   Does not apply DOES NOT GO TO MAR        [Held by provider] dapagliflozin (FARXIGA) tablet 10 mg  10 mg Oral or Feeding Tube QAM   10 mg at 09/03/23 0805    glucose gel 15-30 g  15-30 g Oral Q15 Min PRN        Or    dextrose 50 % injection 25-50 mL  25-50 mL Intravenous Q15 Min PRN        Or    glucagon injection 1 mg  1 mg Subcutaneous Q15 Min PRN        heparin ANTICOAGULANT injection 5,000 Units  5,000 Units Subcutaneous Q8H   5,000 Units at 09/06/23 0804    HOLD nitroGLYcerin IF   Does not apply HOLD        hydrALAZINE (APRESOLINE) injection 20 mg  20 mg Intravenous Q6H PRN   20 mg at 09/04/23 1643    hydrALAZINE (APRESOLINE) tablet 50 mg  50 mg Oral or Feeding Tube Q6H   50 mg at 09/06/23 0635    hydrOXYzine (ATARAX) tablet 25 mg  25 mg Oral or Feeding Tube Q6H PRN   25 mg at 09/05/23 1939    insulin aspart (NovoLOG) injection (RAPID ACTING)   Subcutaneous Daily with breakfast   4 Units at 09/05/23 0833    insulin aspart (NovoLOG) injection (RAPID ACTING)   Subcutaneous Daily with lunch   2 Units at 09/05/23 1200    insulin aspart (NovoLOG) injection (RAPID ACTING)  1-10 Units Subcutaneous TID AC   3 Units at 09/05/23 1603    insulin aspart (NovoLOG) injection (RAPID ACTING)  1-7 Units Subcutaneous At Bedtime   1 Units at 09/04/23 2137    insulin detemir (LEVEMIR PEN) injection 40 Units  40 Units Subcutaneous At Bedtime   40 Units at 09/05/23 2219    lidocaine (LMX4) cream   Topical Q1H PRN        lidocaine 1 % 0.1-1 mL  0.1-1 mL Other Q1H PRN        Medication Instruction -  Avoid dextrose in IV solutions   Intravenous Continuous PRN        menthol (Topical Analgesic) 2.5% (BENGAY VANISHING SCENT) 2.5 % topical gel   Topical Q6H PRN        naloxone (NARCAN) injection 0.2 mg  0.2 mg Intravenous Q2 Min PRN        Or    naloxone (NARCAN) injection 0.4 mg  0.4 mg Intravenous Q2 Min PRN        Or    naloxone (NARCAN) injection 0.2 mg  0.2 mg Intramuscular Q2 Min PRN        Or    naloxone (NARCAN) injection 0.4 mg  0.4 mg Intramuscular Q2 Min PRN        ondansetron (ZOFRAN) injection 4 mg  4 mg Intravenous Q6H PRN   4 mg at 09/02/23 1043    oxyCODONE (ROXICODONE) tablet 5 mg  5 mg Oral or Feeding Tube Q6H PRN   5 mg at 09/06/23 0804    polyethylene glycol (MIRALAX) Packet 17 g  17 g Oral or Feeding Tube Daily   17 g at 09/03/23 1400    [Held by provider] potassium chloride (KAYCIEL) solution 60 mEq  60 mEq Oral or Feeding Tube TID        potassium chloride ER (KLOR-CON M) CR tablet 20 mEq  20 mEq Oral Once        prochlorperazine (COMPAZINE) injection 5-10 mg  5-10 mg Intravenous Q6H PRN   5 mg at 09/02/23 1512    Reason beta blocker not prescribed   Does not apply DOES NOT GO TO MAR        [Held by provider] sacubitril-valsartan (ENTRESTO)  MG per tablet 1 tablet  1 tablet Oral BID   1 tablet at 09/02/23 0824    senna-docusate (SENOKOT-S/PERICOLACE) 8.6-50 MG per tablet 1-2 tablet  1-2 tablet Oral or Feeding Tube BID   1 tablet at 09/05/23 1932    sertraline (ZOLOFT) tablet 150 mg  150 mg Oral or Feeding Tube Daily   150 mg at 09/06/23 0804    sodium chloride (PF) 0.9% PF flush 3 mL  3 mL Intracatheter Q8H   3 mL at 09/06/23 0349    sodium chloride (PF) 0.9% PF flush 3 mL  3 mL Intracatheter q1 min prn        [Held by provider] spironolactone (ALDACTONE) tablet 50 mg  50 mg Oral or Feeding Tube QAM   50 mg at 09/03/23 0804    traZODone (DESYREL) tablet 50 mg  50 mg Oral At Bedtime PRN   50 mg at 09/05/23 0467     No current [facility]-ordered outpatient medications on file.

## 2024-09-30 ENCOUNTER — OFFICE VISIT (OUTPATIENT)
Dept: OBGYN CLINIC | Age: 44
End: 2024-09-30
Payer: COMMERCIAL

## 2024-09-30 VITALS
SYSTOLIC BLOOD PRESSURE: 107 MMHG | HEART RATE: 83 BPM | DIASTOLIC BLOOD PRESSURE: 80 MMHG | BODY MASS INDEX: 24.96 KG/M2 | WEIGHT: 150 LBS

## 2024-09-30 DIAGNOSIS — Z30.41 ENCOUNTER FOR SURVEILLANCE OF CONTRACEPTIVE PILLS: ICD-10-CM

## 2024-09-30 DIAGNOSIS — Z76.89 ENCOUNTER TO ESTABLISH CARE: Primary | ICD-10-CM

## 2024-09-30 DIAGNOSIS — R63.5 ABNORMAL WEIGHT GAIN: ICD-10-CM

## 2024-09-30 DIAGNOSIS — N95.1 SYMPTOMATIC MENOPAUSAL OR FEMALE CLIMACTERIC STATES: ICD-10-CM

## 2024-09-30 DIAGNOSIS — Z12.4 SCREENING FOR CERVICAL CANCER: ICD-10-CM

## 2024-09-30 DIAGNOSIS — Z12.31 ENCOUNTER FOR SCREENING MAMMOGRAM FOR MALIGNANT NEOPLASM OF BREAST: ICD-10-CM

## 2024-09-30 DIAGNOSIS — Z12.39 ENCOUNTER FOR SCREENING BREAST EXAMINATION: ICD-10-CM

## 2024-09-30 DIAGNOSIS — R53.83 OTHER FATIGUE: ICD-10-CM

## 2024-09-30 DIAGNOSIS — Z01.419 WELL WOMAN EXAM WITH ROUTINE GYNECOLOGICAL EXAM: ICD-10-CM

## 2024-09-30 PROCEDURE — 99213 OFFICE O/P EST LOW 20 MIN: CPT | Performed by: NURSE PRACTITIONER

## 2024-09-30 PROCEDURE — 99386 PREV VISIT NEW AGE 40-64: CPT | Performed by: NURSE PRACTITIONER

## 2024-09-30 RX ORDER — ESCITALOPRAM OXALATE 20 MG/1
20 TABLET ORAL DAILY
COMMUNITY

## 2024-09-30 RX ORDER — LEVOCETIRIZINE DIHYDROCHLORIDE 5 MG/1
5 TABLET, FILM COATED ORAL NIGHTLY
COMMUNITY

## 2024-09-30 RX ORDER — BUTALBITAL, ACETAMINOPHEN AND CAFFEINE 50; 325; 40 MG/1; MG/1; MG/1
1 TABLET ORAL EVERY 4 HOURS PRN
COMMUNITY

## 2024-09-30 ASSESSMENT — ENCOUNTER SYMPTOMS
DIARRHEA: 0
ALLERGIC/IMMUNOLOGIC NEGATIVE: 1
RESPIRATORY NEGATIVE: 1
EYES NEGATIVE: 1
CONSTIPATION: 0
GASTROINTESTINAL NEGATIVE: 1

## 2024-09-30 NOTE — PROGRESS NOTES
Pt presents today for pap smear and breast exam. She is wanting to discuss perimenopausal having fatigue, hot flashes, migraines around period, abnormal weight gain put on almost 25 pounds, and bloating. She was told by her PCP to try fasting and she did that eating once every 24 hours and did not help. She has seen Dr Dee before. She states that if she stays on her OCP she will need a refill.     Mammo:4/2024  Pap smear:12/2023  Contraception:OCP   G:3  P:2  Ab:1  Bone density:NA   Colonoscopy:NA

## 2024-09-30 NOTE — PROGRESS NOTES
Yolande Reyna is a 44 y.o. female who presents today for her medical conditions/ complaints as noted below. Yolande Reyna is c/o of Annual Exam        HPI  New pt presents to establish care. Due for well woman exam and pap smear. Current with screening mammogram. Dr Mauro PCP- draws labs and manages meds. Has upcoming labs- drawn every 6 months. Started having night sweats when she was about 38 years old. Experience the loss of both parents around this time and shortly after. Has done well with OCP for heavy, painful periods. But now having more hot flashes and night sweats. Sometimes dryness with sex. Works out regularly and lives a healthy lifestyle. Drinks plenty of water. Does not typically sleep well at night. Has gained about 25 lbs regardless of healthy lifestyle changes in the past few years. Feels like could be going through some hormonal changes. Her mother was around 45 years old when she went through the change. Pt was around 14 when she started menses. Interested in further testing and tx options.     Mammo:2024  Pap smear:2023  Contraception:OCP, vasectomy  G:3  P:2  Ab:1  Bone density:NA   Colonoscopy:NA   Patient's last menstrual period was 2024 (exact date).      Past Medical History:   Diagnosis Date    Headache     Hyperlipidemia     Mitral valve prolapse      Past Surgical History:   Procedure Laterality Date    CERVICAL SPINE SURGERY N/A 2019    CERVICAL INTERLAMINAR SELIN C5-6 performed by Ben Valdez at [facility] OR    CERVICAL SPINE SURGERY N/A 2020    CERVICAL INTERLAMINAR SELIN C6-7 performed by Ben Valdez at [facility] OR    DILATION AND CURETTAGE OF UTERUS      EPIDURAL STEROID INJECTION N/A 2019    EPIDURAL STEROID INJECTION C6-7 performed by Ben Valdez at [facility] OR    FACIAL SURGERY      reconstruction, has metal in face     SINUS SURGERY       History reviewed. No pertinent family history.  Social History     Tobacco Use    Smoking

## 2024-10-03 LAB
HPV HR 12 DNA SPEC QL NAA+PROBE: NOT DETECTED
HPV16 DNA SPEC QL NAA+PROBE: NOT DETECTED
HPV16+18+H RISK 12 DNA SPEC-IMP: NORMAL
HPV18 DNA SPEC QL NAA+PROBE: NOT DETECTED

## 2024-10-29 ENCOUNTER — OFFICE VISIT (OUTPATIENT)
Dept: ENT CLINIC | Age: 44
End: 2024-10-29
Payer: COMMERCIAL

## 2024-10-29 VITALS
SYSTOLIC BLOOD PRESSURE: 100 MMHG | DIASTOLIC BLOOD PRESSURE: 60 MMHG | WEIGHT: 154 LBS | BODY MASS INDEX: 25.66 KG/M2 | HEIGHT: 65 IN

## 2024-10-29 DIAGNOSIS — R09.81 NASAL CONGESTION: ICD-10-CM

## 2024-10-29 DIAGNOSIS — R09.A2 GLOBUS SENSATION: ICD-10-CM

## 2024-10-29 DIAGNOSIS — R13.10 DYSPHAGIA, UNSPECIFIED TYPE: ICD-10-CM

## 2024-10-29 DIAGNOSIS — R49.9 CHANGE IN VOICE: ICD-10-CM

## 2024-10-29 DIAGNOSIS — J32.9 RECURRENT SINUSITIS: Primary | ICD-10-CM

## 2024-10-29 DIAGNOSIS — R09.82 POSTNASAL DRIP: ICD-10-CM

## 2024-10-29 PROCEDURE — 31575 DIAGNOSTIC LARYNGOSCOPY: CPT | Performed by: NURSE PRACTITIONER

## 2024-10-29 PROCEDURE — 99204 OFFICE O/P NEW MOD 45 MIN: CPT | Performed by: NURSE PRACTITIONER

## 2024-10-29 RX ORDER — CLINDAMYCIN HYDROCHLORIDE 300 MG/1
300 CAPSULE ORAL 3 TIMES DAILY
Qty: 30 CAPSULE | Refills: 0 | Status: SHIPPED | OUTPATIENT
Start: 2024-10-29 | End: 2024-11-08

## 2024-10-29 RX ORDER — PREDNISONE 20 MG/1
TABLET ORAL
Qty: 21 TABLET | Refills: 0 | Status: SHIPPED | OUTPATIENT
Start: 2024-10-29

## 2024-10-29 ASSESSMENT — ENCOUNTER SYMPTOMS
RHINORRHEA: 1
VOICE CHANGE: 1
RESPIRATORY NEGATIVE: 1
ALLERGIC/IMMUNOLOGIC NEGATIVE: 1
EYES NEGATIVE: 1
TROUBLE SWALLOWING: 1
GASTROINTESTINAL NEGATIVE: 1

## 2024-10-29 NOTE — PROGRESS NOTES
10/29/2024    Yolande Reyna (:  1980) is a 44 y.o. female, Established patient, here for evaluation of the following chief complaint(s):  New Patient (Sinus )      Vitals:    10/29/24 1540   BP: 100/60   Weight: 69.9 kg (154 lb)   Height: 1.651 m (5' 5\")       Wt Readings from Last 3 Encounters:   10/29/24 69.9 kg (154 lb)   24 68 kg (150 lb)   22 68 kg (150 lb)       BP Readings from Last 3 Encounters:   10/29/24 100/60   24 107/80   23 102/72         SUBJECTIVE/OBJECTIVE:    Patient seen today for sinuses. She states that she has had sinus issues for several years. She had sinus surgery in  and did well afterwards, but in the last 4-5 years she has started to have recurrent sinus infections. She reports 5 sinus infections this year. She has been on cefdinir, medrol dose pack, Augmentin, and got IM steroids. She states these help, but then her symptoms return a few days later. She complains of nasal congestion, sinus pressure, post nasal drip, intermittent rhinorrhea, aural fullness, muffled hearing, and ear popping. She also complains of intermittent globus sensation, voice changes, and difficulty swallowing that have progressively gotten worse over the last few years. She is on famotidine and has not noticed improvement in this.         Review of Systems   Constitutional: Negative.    HENT:  Positive for congestion, ear pain (fullness), hearing loss (muffled), postnasal drip, rhinorrhea, trouble swallowing and voice change.         Intermittent globus sensation   Eyes: Negative.    Respiratory: Negative.     Cardiovascular: Negative.    Gastrointestinal: Negative.    Endocrine: Negative.    Musculoskeletal: Negative.    Skin: Negative.    Allergic/Immunologic: Negative.    Neurological: Negative.    Hematological: Negative.    Psychiatric/Behavioral: Negative.          Physical Exam  Vitals reviewed.   Constitutional:       Appearance: Normal appearance. She is normal

## 2024-11-05 ENCOUNTER — HOSPITAL ENCOUNTER (OUTPATIENT)
Dept: GENERAL RADIOLOGY | Age: 44
Discharge: HOME | End: 2024-11-05
Payer: COMMERCIAL

## 2024-11-05 DIAGNOSIS — J32.9 RECURRENT SINUSITIS: ICD-10-CM

## 2024-11-05 PROCEDURE — 70486 CT MAXILLOFACIAL W/O DYE: CPT

## 2024-11-07 DIAGNOSIS — N95.1 SYMPTOMATIC MENOPAUSAL OR FEMALE CLIMACTERIC STATES: ICD-10-CM

## 2024-11-07 LAB
25(OH)D3 SERPL-MCNC: 68.7 NG/ML
CORTIS SERPL-MCNC: 3.2 UG/DL
ESTRADIOL SERPL-SCNC: 450.1 PG/ML
FSH SERPL-SCNC: 3.6 MIU/ML
PROGEST SERPL-MCNC: <0.05 NG/ML
TESTOST SERPL-MCNC: <2.5 NG/DL (ref 8.4–48.1)

## 2024-11-09 LAB — DHEA-S SERPL-MCNC: 12 UG/DL (ref 61–337)

## 2024-11-18 ENCOUNTER — HOSPITAL ENCOUNTER (OUTPATIENT)
Dept: GENERAL RADIOLOGY | Age: 44
Discharge: HOME | End: 2024-11-18
Payer: COMMERCIAL

## 2024-11-18 ENCOUNTER — TELEPHONE (OUTPATIENT)
Dept: ENT CLINIC | Age: 44
End: 2024-11-18

## 2024-11-18 DIAGNOSIS — R13.10 DYSPHAGIA, UNSPECIFIED TYPE: Primary | ICD-10-CM

## 2024-11-18 DIAGNOSIS — R13.10 DYSPHAGIA, UNSPECIFIED TYPE: ICD-10-CM

## 2024-11-18 DIAGNOSIS — K44.9 SLIDING HIATAL HERNIA: ICD-10-CM

## 2024-11-18 DIAGNOSIS — R09.A2 GLOBUS SENSATION: ICD-10-CM

## 2024-11-18 PROCEDURE — 74220 X-RAY XM ESOPHAGUS 1CNTRST: CPT

## 2024-11-18 RX ORDER — FAMOTIDINE 20 MG/1
20 TABLET, FILM COATED ORAL DAILY
COMMUNITY
End: 2024-11-20 | Stop reason: ALTCHOICE

## 2024-11-18 NOTE — TELEPHONE ENCOUNTER
Called patient and reviewed esophagram results. Esophagram showed \"Small sliding-type hiatal hernia with reflux observed in the distal third of the esophagus.\" Recommended referral to GI. She voiced understanding and is agreeable to this.

## 2024-11-19 NOTE — PROGRESS NOTES
Subjective:     Patient ID: Yolande Reyna is a 44 y.o. female  PCP: Stacy Mauro DO  Referring Provider: Virginia Serna APRN - C*    HPI  Patient presents to the office today with the following complaints: New Patient and Dysphagia      Patient seen in the office today with complaints of dysphagia. She currently takes Pepcid 20 mg twice daily since the end of September and it does not seem like it has helped at all. She states she gets choked when eating and  feels like a lump in her throat. She has had hoarseness off and on for years. She had a barium swallow which showed a hiatal hernia. She denies epigastric pain, nausea, or vomiting.     11/18/2024 Barium swallow/esophagram: small sliding-type hiatal hernia with reflux observed in the distal third of the esophagus.    Last EGD -never  Last Colonoscopy -never  Denies family hx colon cancer/colon polyps       Assessment:     1. Esophageal dysphagia    2. Globus sensation    3. Hoarseness    4. Gastroesophageal reflux disease, unspecified whether esophagitis present              Plan:   Protonix 40 mg daily- Rx given  Follow up in 6 months  Schedule EGD  Nothing to eat or drink after midnight.  No driving for 24 hours after procedure. Bring a  to procedure.  No aspirin, NSAIDs, fish oil 5 days before procedure.  I have discussed the benefits, alternatives, and risks (including bleeding, perforation and death)  for pursuing Endoscopy (EGD/Colonscopy/EUS/ERCP) with the patient and they are willing to continue. We also discussed the need for anesthesia, IV access, proper dietary changes, medication changes if necessary, and need for bowel prep (if ordered) prior to their Endoscopic procedure.  They are aware they must have someone accompany them to their scheduled procedure to drive them home - they agree to the above and are willing to continue.      Orders  No orders of the defined types were placed in this encounter.    Medications  Orders

## 2024-11-20 ENCOUNTER — OFFICE VISIT (OUTPATIENT)
Dept: GASTROENTEROLOGY | Age: 44
End: 2024-11-20
Payer: COMMERCIAL

## 2024-11-20 VITALS
SYSTOLIC BLOOD PRESSURE: 110 MMHG | HEIGHT: 65 IN | DIASTOLIC BLOOD PRESSURE: 70 MMHG | WEIGHT: 160 LBS | HEART RATE: 80 BPM | BODY MASS INDEX: 26.66 KG/M2 | OXYGEN SATURATION: 99 %

## 2024-11-20 DIAGNOSIS — R09.A2 GLOBUS SENSATION: ICD-10-CM

## 2024-11-20 DIAGNOSIS — R13.19 ESOPHAGEAL DYSPHAGIA: Primary | ICD-10-CM

## 2024-11-20 DIAGNOSIS — R49.0 HOARSENESS: ICD-10-CM

## 2024-11-20 DIAGNOSIS — K21.9 GASTROESOPHAGEAL REFLUX DISEASE, UNSPECIFIED WHETHER ESOPHAGITIS PRESENT: ICD-10-CM

## 2024-11-20 PROBLEM — Q79.60 EHLERS-DANLOS SYNDROME: Status: ACTIVE | Noted: 2022-10-25

## 2024-11-20 PROCEDURE — 99214 OFFICE O/P EST MOD 30 MIN: CPT

## 2024-11-20 RX ORDER — LIOTHYRONINE SODIUM 5 UG/1
5 TABLET ORAL DAILY
COMMUNITY

## 2024-11-20 RX ORDER — PANTOPRAZOLE SODIUM 40 MG/1
40 TABLET, DELAYED RELEASE ORAL
Qty: 30 TABLET | Refills: 11 | Status: SHIPPED | OUTPATIENT
Start: 2024-11-20

## 2024-11-20 ASSESSMENT — ENCOUNTER SYMPTOMS
TROUBLE SWALLOWING: 1
EYES NEGATIVE: 1
APNEA: 0
NAUSEA: 0
VOMITING: 0
ALLERGIC/IMMUNOLOGIC NEGATIVE: 1
CHOKING: 1
ABDOMINAL PAIN: 0
GASTROINTESTINAL NEGATIVE: 1
VOICE CHANGE: 1

## 2024-12-03 ENCOUNTER — OFFICE VISIT (OUTPATIENT)
Dept: ENT CLINIC | Age: 44
End: 2024-12-03
Payer: COMMERCIAL

## 2024-12-03 VITALS
SYSTOLIC BLOOD PRESSURE: 110 MMHG | DIASTOLIC BLOOD PRESSURE: 72 MMHG | WEIGHT: 162 LBS | BODY MASS INDEX: 26.99 KG/M2 | HEIGHT: 65 IN

## 2024-12-03 DIAGNOSIS — R09.81 NASAL CONGESTION: ICD-10-CM

## 2024-12-03 DIAGNOSIS — Z91.09 ENVIRONMENTAL ALLERGIES: Primary | ICD-10-CM

## 2024-12-03 DIAGNOSIS — R09.82 POSTNASAL DRIP: ICD-10-CM

## 2024-12-03 DIAGNOSIS — K44.9 SLIDING HIATAL HERNIA: ICD-10-CM

## 2024-12-03 DIAGNOSIS — J34.3 HYPERTROPHY OF NASAL TURBINATES: ICD-10-CM

## 2024-12-03 PROCEDURE — 99213 OFFICE O/P EST LOW 20 MIN: CPT | Performed by: NURSE PRACTITIONER

## 2024-12-03 RX ORDER — AZELASTINE 1 MG/ML
1 SPRAY, METERED NASAL 2 TIMES DAILY
Qty: 60 ML | Refills: 1 | Status: SHIPPED | OUTPATIENT
Start: 2024-12-03

## 2024-12-03 RX ORDER — AZELASTINE 1 MG/ML
1 SPRAY, METERED NASAL 2 TIMES DAILY
Qty: 60 ML | Refills: 1 | Status: SHIPPED | OUTPATIENT
Start: 2024-12-03 | End: 2024-12-03

## 2024-12-03 ASSESSMENT — ENCOUNTER SYMPTOMS
RHINORRHEA: 1
GASTROINTESTINAL NEGATIVE: 1
EYES NEGATIVE: 1
SINUS PRESSURE: 1
TROUBLE SWALLOWING: 1
ALLERGIC/IMMUNOLOGIC NEGATIVE: 1
VOICE CHANGE: 1
RESPIRATORY NEGATIVE: 1

## 2024-12-03 NOTE — PROGRESS NOTES
12/3/2024    Yolande Reyna (:  1980) is a 44 y.o. female, Established patient, here for evaluation of the following chief complaint(s):  Follow-up (SINUSITIS )      Vitals:    24 1546   BP: 110/72   Weight: 73.5 kg (162 lb)   Height: 1.651 m (5' 5\")       Wt Readings from Last 3 Encounters:   24 73.5 kg (162 lb)   24 72.6 kg (160 lb)   10/29/24 69.9 kg (154 lb)       BP Readings from Last 3 Encounters:   24 110/72   24 110/70   10/29/24 100/60         SUBJECTIVE/OBJECTIVE:    Patient seen today for follow up of sinuses. She was given antibiotics and steroids at her last visit. She states that she did get better, but then her symptoms returned. She complains of nasal congestion that is worse when laying down, post nasal drip, rhinorrhea, sinus pressure, and aural fullness. She is currently on Xyzal and reports she has previously taken Allegra. She had a CT of her sinuses on 24 that showed previous sinus surgery and mild mucosal thickening of the inferior nasal turbinates. She did see GI and has an EGD scheduled tomorrow. She was started on Protonix last week at her appointment with them. She still has trouble swallowing, voice changes, and globus sensation.         Review of Systems   Constitutional: Negative.    HENT:  Positive for congestion, ear pain (aural fullness bilateral), postnasal drip, rhinorrhea, sinus pressure, trouble swallowing and voice change.    Eyes: Negative.    Respiratory: Negative.     Cardiovascular: Negative.    Gastrointestinal: Negative.    Endocrine: Negative.    Musculoskeletal: Negative.    Skin: Negative.    Allergic/Immunologic: Negative.    Neurological: Negative.    Hematological: Negative.    Psychiatric/Behavioral: Negative.          Physical Exam  Vitals reviewed.   Constitutional:       Appearance: Normal appearance. She is normal weight.   HENT:      Head: Normocephalic and atraumatic.      Right Ear: Tympanic membrane, ear

## 2024-12-04 ENCOUNTER — HOSPITAL ENCOUNTER (OUTPATIENT)
Age: 44
Setting detail: OUTPATIENT SURGERY
Discharge: HOME | End: 2024-12-04
Attending: INTERNAL MEDICINE | Admitting: INTERNAL MEDICINE

## 2024-12-04 ENCOUNTER — ANESTHESIA EVENT (OUTPATIENT)
Dept: OPERATING ROOM | Age: 44
End: 2024-12-04

## 2024-12-04 ENCOUNTER — APPOINTMENT (OUTPATIENT)
Dept: OPERATING ROOM | Age: 44
End: 2024-12-04
Attending: INTERNAL MEDICINE

## 2024-12-04 ENCOUNTER — ANESTHESIA (OUTPATIENT)
Dept: OPERATING ROOM | Age: 44
End: 2024-12-04

## 2024-12-04 ENCOUNTER — HOSPITAL ENCOUNTER (OUTPATIENT)
Age: 44
Setting detail: SPECIMEN
Discharge: HOME | End: 2024-12-04
Payer: COMMERCIAL

## 2024-12-04 VITALS
TEMPERATURE: 97.5 F | RESPIRATION RATE: 18 BRPM | WEIGHT: 155 LBS | HEART RATE: 80 BPM | BODY MASS INDEX: 25.83 KG/M2 | SYSTOLIC BLOOD PRESSURE: 98 MMHG | DIASTOLIC BLOOD PRESSURE: 63 MMHG | HEIGHT: 65 IN | OXYGEN SATURATION: 100 %

## 2024-12-04 LAB
CONTROL: NORMAL
PREGNANCY TEST URINE, POC: NORMAL

## 2024-12-04 PROCEDURE — G8907 PT DOC NO EVENTS ON DISCHARG: HCPCS

## 2024-12-04 PROCEDURE — G8918 PT W/O PREOP ORDER IV AB PRO: HCPCS

## 2024-12-04 PROCEDURE — 43239 EGD BIOPSY SINGLE/MULTIPLE: CPT

## 2024-12-04 PROCEDURE — 88305 TISSUE EXAM BY PATHOLOGIST: CPT

## 2024-12-04 PROCEDURE — 88342 IMHCHEM/IMCYTCHM 1ST ANTB: CPT

## 2024-12-04 PROCEDURE — 43450 DILATE ESOPHAGUS 1/MULT PASS: CPT

## 2024-12-04 RX ORDER — SODIUM CHLORIDE 9 MG/ML
INJECTION, SOLUTION INTRAVENOUS CONTINUOUS
Status: DISCONTINUED | OUTPATIENT
Start: 2024-12-04 | End: 2024-12-04 | Stop reason: HOSPADM

## 2024-12-04 RX ORDER — LIDOCAINE HYDROCHLORIDE 10 MG/ML
INJECTION, SOLUTION EPIDURAL; INFILTRATION; INTRACAUDAL; PERINEURAL
Status: DISCONTINUED | OUTPATIENT
Start: 2024-12-04 | End: 2024-12-04 | Stop reason: SDUPTHER

## 2024-12-04 RX ORDER — PROPOFOL 10 MG/ML
INJECTION, EMULSION INTRAVENOUS
Status: DISCONTINUED | OUTPATIENT
Start: 2024-12-04 | End: 2024-12-04 | Stop reason: SDUPTHER

## 2024-12-04 RX ADMIN — SODIUM CHLORIDE: 9 INJECTION, SOLUTION INTRAVENOUS at 07:36

## 2024-12-04 RX ADMIN — LIDOCAINE HYDROCHLORIDE 30 MG: 10 INJECTION, SOLUTION EPIDURAL; INFILTRATION; INTRACAUDAL; PERINEURAL at 08:13

## 2024-12-04 RX ADMIN — PROPOFOL 200 MG: 10 INJECTION, EMULSION INTRAVENOUS at 08:13

## 2024-12-04 ASSESSMENT — PAIN - FUNCTIONAL ASSESSMENT: PAIN_FUNCTIONAL_ASSESSMENT: 0-10

## 2024-12-04 NOTE — ANESTHESIA PRE PROCEDURE
Date   • Headache    • Hyperlipidemia    • Mitral valve prolapse        Past Surgical History:        Procedure Laterality Date   • CERVICAL SPINE SURGERY N/A 11/12/2019    CERVICAL INTERLAMINAR SELIN C5-6 performed by Ben Valdez at [facility] OR   • CERVICAL SPINE SURGERY N/A 03/17/2020    CERVICAL INTERLAMINAR SELIN C6-7 performed by eBn Valdez at [facility] OR   • DILATION AND CURETTAGE OF UTERUS     • EPIDURAL STEROID INJECTION N/A 12/17/2019    EPIDURAL STEROID INJECTION C6-7 performed by Ben Valdez at [facility] OR   • FACIAL SURGERY      reconstruction, has metal in face    • SINUS SURGERY      2004       Social History:    Social History     Tobacco Use   • Smoking status: Former     Current packs/day: 1.00     Types: Cigarettes   • Smokeless tobacco: Never   Substance Use Topics   • Alcohol use: Not on file     Comment: very rare                                 Counseling given: Not Answered      Vital Signs (Current): There were no vitals filed for this visit.                                           BP Readings from Last 3 Encounters:   12/03/24 110/72   11/20/24 110/70   10/29/24 100/60       NPO Status:                                                                                 BMI:   Wt Readings from Last 3 Encounters:   12/03/24 73.5 kg (162 lb)   11/20/24 72.6 kg (160 lb)   10/29/24 69.9 kg (154 lb)     There is no height or weight on file to calculate BMI.    CBC: No results found for: \"WBC\", \"RBC\", \"HGB\", \"HCT\", \"MCV\", \"RDW\", \"PLT\"    CMP: No results found for: \"NA\", \"K\", \"CL\", \"CO2\", \"BUN\", \"CREATININE\", \"GFRAA\", \"AGRATIO\", \"LABGLOM\", \"GLUCOSE\", \"GLU\", \"CALCIUM\", \"BILITOT\", \"ALKPHOS\", \"AST\", \"ALT\"    POC Tests: No results for input(s): \"POCGLU\", \"POCNA\", \"POCK\", \"POCCL\", \"POCBUN\", \"POCHEMO\", \"POCHCT\" in the last 72 hours.    Coags: No results found for: \"PROTIME\", \"INR\", \"APTT\"    HCG (If Applicable): No results found for: \"PREGTESTUR\", \"PREGSERUM\", \"HCG\", \"HCGQUANT\"     ABGs: No results

## 2024-12-04 NOTE — DISCHARGE INSTRUCTIONS
1.  Await path results, the patient will be contacted in 7-10 days with biopsy results.   2.  Magic mouthwash 5 ml PO Swish and swallow q3h PRN ONLY IF patient has post-procedural sorethroat or chest pain.  3. Full liquids to soft diet today june discharge from the surgicenter; may advance diet starting in AM tomorrow.  4. May resume other meds except any ASA/NSAIDs; may use cough drops or lozenges PRN; also continue meds for GERD with anti-GERD measures.  5. NO ASA/NSAIDs x 2 weeks; also avoid NSAIDs in the future and instead may use Tylenol 500 mg p.o. every 4 hours as needed  6. OP f/u in 6-8 weeks with Ms. Bacon; will consider an Esophageal manometry later if the patient's dysphagia persists.     POST-OP ORDERS: ENDOSCOPY & COLONOSCOPY:    1. Rest today.    2. DO NOT eat or drink until wide awake; eat your usual diet today in moderate amount only.    3. DO NOT drive today.    4. Call physician if you have severe pain, vomiting, fever, rectal bleeding or black bowel movements.    5.  If a biopsy was taken or a polyp removed, you should expect to hear results in about 21 days.  If you have heard nothing from your physician by then, call the office for results.    6.  Discharge home when patient awake, vitals signs stable and tolerating liquids.    7. Call with questions or concerns [phone].    NSAIDS (Non-steroidal Anti-Inflammatory)    You have been directed by your physician to avoid any NSAID's; the following medications are a list of those to avoid. If you think that you are taking any NSAID's notify your physician.     Over The Counter    Advil                      Motrin  Nuprin                   Ibuprofen  Midol                     Aleve  Naproxen              Orudis  Aspirin                   Briana-[place]    Prescriptions and Generics    Cataflam              Relafen  Voltaren               Clinoril  Indocin                 Naproxen  Arthrotec              Lodine  Daypro

## 2024-12-04 NOTE — ANESTHESIA POSTPROCEDURE EVALUATION
Department of Anesthesiology  Postprocedure Note    Patient: Yolande Reyna  MRN: [id number]  YOB: 1980  Date of evaluation: 12/4/2024    Procedure Summary       Date: 12/04/24 Room / Location: [place] [address] / [facility]    Anesthesia Start: 0807 Anesthesia Stop:     Procedures:       ESOPHAGOGASTRODUODENOSCOPY BIOPSY (Esophagus)      ESOPHAGEAL DILATION ROPER 54 FR (Esophagus) Diagnosis:       Gastroesophageal reflux disease, unspecified whether esophagitis present      Hoarseness      Dysphagia, unspecified type      Globus sensation      (Gastroesophageal reflux disease, unspecified whether esophagitis present [K21.9])      (Hoarseness [R49.0])      (Dysphagia, unspecified type [R13.10])      (Globus sensation [R09.A2])    Surgeons: Orlando Garcia MD Responsible Provider: Alice Cheney APRN - CRNA    Anesthesia Type: general, TIVA ASA Status: 1            Anesthesia Type: No value filed.    Monique Phase I: Monique Score: 10    Monique Phase II:      Anesthesia Post Evaluation    Patient location during evaluation: bedside  Patient participation: complete - patient participated  Level of consciousness: sleepy but conscious  Pain score: 0  Airway patency: patent  Nausea & Vomiting: no nausea and no vomiting  Cardiovascular status: blood pressure returned to baseline  Respiratory status: acceptable, room air and spontaneous ventilation  Hydration status: euvolemic  Pain management: adequate        No notable events documented.

## 2024-12-04 NOTE — OP NOTE
Endoscopic Procedure Note    Patient: Yolande Reyna : 1980  Med Rec#: [id number] Acc#: [id number]     Primary Care Provider Stacy Mauro DO    Endoscopist: Orlando Garcia MD, MD    Date of Procedure:  2024    Procedure:   EGD with    A Benítez bougie dilation of esophagus and  Cold biopsies    Indications:     1. Esophageal dysphagia    2. Globus sensation    3. Hoarseness    4. Gastroesophageal reflux disease, unspecified whether esophagitis present    5.  History of NSAIDs use   2024 Barium swallow/esophagram: small sliding-type hiatal hernia with reflux observed in the distal third of the esophagus.     Last EGD -never  Last Colonoscopy -never  Denies family hx colon cancer/colon polyps     Anesthesia:  Sedation was administered by anesthesia who monitored the patient during the procedure.    Estimated Blood Loss: minimal    Procedure:   After reviewing the patient's chart and obtaining informed consent, the patient was placed in the left lateral decubitus position.  A forward-viewing Olympus endoscope was lubricated and inserted through the mouth into the oropharynx. Under direct visualization, the upper esophagus was intubated. The scope was advanced to the level of the third portion of duodenum. Scope was slowly withdrawn with careful inspection of the mucosal surfaces. The scope was retroflexed for inspection of the gastric fundus and incisura. Findings and maneuvers are listed in impression below.     Next, a lubricated Benítez weighted Bougie dilator-54 Fr was gently introduced into the patient's mouth and passed into the Esophagus and into the proximal stomach without much resistance and then withdrawn. Repeat EGD was performed to verify dilation and scope tip was passed into the stomach. NO evidence of perforation or excessive bleeding was noted subsequent to the dilation.        The patient tolerated the procedure well. The scope was removed. There were no

## 2025-01-09 ENCOUNTER — OFFICE VISIT (OUTPATIENT)
Dept: ENT CLINIC | Age: 45
End: 2025-01-09
Payer: COMMERCIAL

## 2025-01-09 VITALS
WEIGHT: 162 LBS | DIASTOLIC BLOOD PRESSURE: 72 MMHG | HEIGHT: 65 IN | BODY MASS INDEX: 26.99 KG/M2 | SYSTOLIC BLOOD PRESSURE: 118 MMHG

## 2025-01-09 DIAGNOSIS — J34.3 HYPERTROPHY OF NASAL TURBINATES: Primary | ICD-10-CM

## 2025-01-09 PROCEDURE — 99214 OFFICE O/P EST MOD 30 MIN: CPT | Performed by: OTOLARYNGOLOGY

## 2025-01-09 ASSESSMENT — ENCOUNTER SYMPTOMS
GASTROINTESTINAL NEGATIVE: 1
EYES NEGATIVE: 1
ALLERGIC/IMMUNOLOGIC NEGATIVE: 1
RESPIRATORY NEGATIVE: 1

## 2025-01-09 NOTE — PROGRESS NOTES
2025    Yolande Reyna (:  1980) is a 44 y.o. female, Established patient, here for evaluation of the following chief complaint(s):  Follow-up (Sinusitis)      Vitals:    25 1620   BP: 118/72   Weight: 73.5 kg (162 lb)   Height: 1.651 m (5' 5\")       Wt Readings from Last 3 Encounters:   25 73.5 kg (162 lb)   24 70.3 kg (155 lb)   24 73.5 kg (162 lb)       BP Readings from Last 3 Encounters:   25 118/72   24 98/63   24 110/72         SUBJECTIVE/OBJECTIVE:    Patient seen today for her nose.  She has a history of sinus surgery many years ago but she says she still gets occasional infections and she cannot breathe through her nose at all.  She had a CT scan which I reviewed which shows significant evidence of sinus surgery and turbinate hypertrophy.        Review of Systems   Constitutional: Negative.    HENT:  Positive for congestion.    Eyes: Negative.    Respiratory: Negative.     Cardiovascular: Negative.    Gastrointestinal: Negative.    Endocrine: Negative.    Musculoskeletal: Negative.    Skin: Negative.    Allergic/Immunologic: Negative.    Neurological: Negative.    Hematological: Negative.    Psychiatric/Behavioral: Negative.          Physical Exam  Vitals reviewed.   Constitutional:       Appearance: Normal appearance. She is normal weight.   HENT:      Head: Normocephalic and atraumatic.      Right Ear: Tympanic membrane, ear canal and external ear normal.      Left Ear: Tympanic membrane, ear canal and external ear normal.      Nose: Nose normal.      Right Turbinates: Enlarged.      Left Turbinates: Enlarged.      Mouth/Throat:      Mouth: Mucous membranes are moist.      Pharynx: Oropharynx is clear.   Eyes:      Extraocular Movements: Extraocular movements intact.      Pupils: Pupils are equal, round, and reactive to light.   Cardiovascular:      Rate and Rhythm: Normal rate and regular rhythm.   Pulmonary:      Effort: Pulmonary effort is

## 2025-01-13 ENCOUNTER — PREP FOR PROCEDURE (OUTPATIENT)
Dept: ENT CLINIC | Age: 45
End: 2025-01-13

## 2025-01-13 DIAGNOSIS — J34.3 HYPERTROPHY OF NASAL TURBINATES: ICD-10-CM

## 2025-01-14 NOTE — PROGRESS NOTES
Standing Expiration Date:   1/15/2026     Medications  No orders of the defined types were placed in this encounter.        Patient History:     Past Medical History:   Diagnosis Date    Headache     Mitral valve prolapse        Past Surgical History:   Procedure Laterality Date    CERVICAL SPINE SURGERY N/A 11/12/2019    CERVICAL INTERLAMINAR SELIN C5-6 performed by Ben Valdez at [facility] OR    CERVICAL SPINE SURGERY N/A 03/17/2020    CERVICAL INTERLAMINAR SELIN C6-7 performed by Ben Valdez at [facility] OR    DILATION AND CURETTAGE OF UTERUS      EPIDURAL STEROID INJECTION N/A 12/17/2019    EPIDURAL STEROID INJECTION C6-7 performed by Ben Valdez at [facility] OR    ESOPHAGEAL DILATATION N/A 12/04/2024    Jackelin Fam 54 fr dil    FACIAL SURGERY      reconstruction, has metal in face     SINUS SURGERY      2004    UPPER GASTROINTESTINAL ENDOSCOPY N/A 12/04/2024    ROCHELLE Fam 54 fr dil, (-) EOE, (-) h pylori, sugg of mod chemical gastritis,       Family History   Problem Relation Age of Onset    Colon Polyps Mother     Colon Polyps Father     Crohn's Disease Father     Crohn's Disease Paternal Aunt     Crohn's Disease Paternal Aunt     Colon Cancer Neg Hx     Liver Cancer Neg Hx        Social History     Socioeconomic History    Marital status:      Spouse name: None    Number of children: None    Years of education: None    Highest education level: None   Tobacco Use    Smoking status: Former     Current packs/day: 1.00     Types: Cigarettes    Smokeless tobacco: Never   Vaping Use    Vaping status: Every Day    Substances: Nicotine   Substance and Sexual Activity    Alcohol use: Never     Comment: very rare     Drug use: Never    Sexual activity: Yes     Partners: Male     Social Determinants of Health      Received from [facility]    Family and Community Support    Received from [facility]    Abuse Screen    Received from [facility]

## 2025-01-15 ENCOUNTER — OFFICE VISIT (OUTPATIENT)
Dept: GASTROENTEROLOGY | Age: 45
End: 2025-01-15
Payer: COMMERCIAL

## 2025-01-15 VITALS
DIASTOLIC BLOOD PRESSURE: 70 MMHG | WEIGHT: 160 LBS | SYSTOLIC BLOOD PRESSURE: 120 MMHG | HEART RATE: 76 BPM | OXYGEN SATURATION: 98 % | BODY MASS INDEX: 26.66 KG/M2 | HEIGHT: 65 IN

## 2025-01-15 DIAGNOSIS — Z12.11 COLON CANCER SCREENING: ICD-10-CM

## 2025-01-15 DIAGNOSIS — K21.9 GASTROESOPHAGEAL REFLUX DISEASE WITHOUT ESOPHAGITIS: Primary | ICD-10-CM

## 2025-01-15 DIAGNOSIS — K21.9 GASTROESOPHAGEAL REFLUX DISEASE WITHOUT ESOPHAGITIS: ICD-10-CM

## 2025-01-15 LAB
ALBUMIN SERPL-MCNC: 4.4 G/DL (ref 3.5–5.2)
ALP SERPL-CCNC: 70 U/L (ref 35–104)
ALT SERPL-CCNC: 20 U/L (ref 5–33)
ANION GAP SERPL CALCULATED.3IONS-SCNC: 9 MMOL/L (ref 7–19)
AST SERPL-CCNC: 22 U/L (ref 5–32)
BASOPHILS # BLD: 0.1 K/UL (ref 0–0.2)
BASOPHILS NFR BLD: 0.5 % (ref 0–1)
BILIRUB SERPL-MCNC: 0.2 MG/DL (ref 0.2–1.2)
BUN SERPL-MCNC: 20 MG/DL (ref 6–20)
CALCIUM SERPL-MCNC: 10.2 MG/DL (ref 8.6–10)
CHLORIDE SERPL-SCNC: 102 MMOL/L (ref 98–111)
CO2 SERPL-SCNC: 29 MMOL/L (ref 22–29)
CREAT SERPL-MCNC: 0.8 MG/DL (ref 0.5–0.9)
EOSINOPHIL # BLD: 0 K/UL (ref 0–0.6)
EOSINOPHIL NFR BLD: 0.4 % (ref 0–5)
ERYTHROCYTE [DISTWIDTH] IN BLOOD BY AUTOMATED COUNT: 13.5 % (ref 11.5–14.5)
GLUCOSE SERPL-MCNC: 87 MG/DL (ref 70–99)
HCT VFR BLD AUTO: 42.8 % (ref 37–47)
HGB BLD-MCNC: 13.3 G/DL (ref 12–16)
IMM GRANULOCYTES # BLD: 0 K/UL
LYMPHOCYTES # BLD: 2.8 K/UL (ref 1.1–4.5)
LYMPHOCYTES NFR BLD: 28.6 % (ref 20–40)
MCH RBC QN AUTO: 29.6 PG (ref 27–31)
MCHC RBC AUTO-ENTMCNC: 31.1 G/DL (ref 33–37)
MCV RBC AUTO: 95.1 FL (ref 81–99)
MONOCYTES # BLD: 0.7 K/UL (ref 0–0.9)
MONOCYTES NFR BLD: 7 % (ref 0–10)
NEUTROPHILS # BLD: 6.1 K/UL (ref 1.5–7.5)
NEUTS SEG NFR BLD: 63.2 % (ref 50–65)
PLATELET # BLD AUTO: 240 K/UL (ref 130–400)
PMV BLD AUTO: 10.3 FL (ref 9.4–12.3)
POTASSIUM SERPL-SCNC: 4.8 MMOL/L (ref 3.5–5)
PROT SERPL-MCNC: 6.7 G/DL (ref 6.4–8.3)
RBC # BLD AUTO: 4.5 M/UL (ref 4.2–5.4)
SODIUM SERPL-SCNC: 140 MMOL/L (ref 136–145)
WBC # BLD AUTO: 9.7 K/UL (ref 4.8–10.8)

## 2025-01-15 PROCEDURE — 99214 OFFICE O/P EST MOD 30 MIN: CPT

## 2025-01-15 ASSESSMENT — ENCOUNTER SYMPTOMS
NAUSEA: 0
BLOOD IN STOOL: 0
VOMITING: 0
TROUBLE SWALLOWING: 0
ABDOMINAL DISTENTION: 0
CONSTIPATION: 0
ABDOMINAL PAIN: 0
CHOKING: 0
VOICE CHANGE: 0
DIARRHEA: 0
ALLERGIC/IMMUNOLOGIC NEGATIVE: 1
RESPIRATORY NEGATIVE: 1
EYES NEGATIVE: 1
GASTROINTESTINAL NEGATIVE: 1

## 2025-02-07 ENCOUNTER — TELEMEDICINE (OUTPATIENT)
Dept: OBGYN CLINIC | Age: 45
End: 2025-02-07
Payer: COMMERCIAL

## 2025-02-07 DIAGNOSIS — E27.9 ADRENAL ABNORMALITY (HCC): ICD-10-CM

## 2025-02-07 DIAGNOSIS — E07.9 THYROID DYSFUNCTION: ICD-10-CM

## 2025-02-07 DIAGNOSIS — R79.89 LOW TESTOSTERONE LEVEL IN FEMALE: ICD-10-CM

## 2025-02-07 DIAGNOSIS — R53.83 OTHER FATIGUE: ICD-10-CM

## 2025-02-07 DIAGNOSIS — R63.5 ABNORMAL WEIGHT GAIN: Primary | ICD-10-CM

## 2025-02-07 DIAGNOSIS — N92.6 IRREGULAR MENSES: ICD-10-CM

## 2025-02-07 LAB
T4 FREE SERPL-MCNC: 1.02 NG/DL (ref 0.93–1.7)
TESTOST SERPL-MCNC: <2.5 NG/DL (ref 8.4–48.1)
TSH SERPL DL<=0.005 MIU/L-ACNC: 0.82 UIU/ML (ref 0.27–4.2)

## 2025-02-07 PROCEDURE — 99214 OFFICE O/P EST MOD 30 MIN: CPT | Performed by: NURSE PRACTITIONER

## 2025-02-07 ASSESSMENT — ENCOUNTER SYMPTOMS
CONSTIPATION: 0
GASTROINTESTINAL NEGATIVE: 1
DIARRHEA: 0
EYES NEGATIVE: 1
ALLERGIC/IMMUNOLOGIC NEGATIVE: 1
RESPIRATORY NEGATIVE: 1

## 2025-02-07 NOTE — PROGRESS NOTES
Yolande Reyna is a 44 y.o. female who presents today for her medical conditions/ complaints as noted below. Yolande Reyna is c/o of No chief complaint on file.    Yolande Reyna, was evaluated through a synchronous (real-time) audio-video encounter. The patient (or guardian if applicable) is aware that this is a billable service, which includes applicable co-pays. This Virtual Visit was conducted with patient's (and/or legal guardian's) consent. Patient identification was verified, and a caregiver was present when appropriate.   The patient was located at [place] [address] [facility] [place] [address]  Provider was located at Facility (Appt Dept): [address] [facility] [address]  [address]  [place] [address]  Confirm you are appropriately licensed, registered, or certified to deliver care in the state where the patient is located as indicated above. If you are not or unsure, please re-schedule the visit: Yes, I confirm.       HPI  Pt presents for 4 month f/u on compounded HRT. Was having weight gain, fatigue, hot flashes and night sweats. FSH wnl. Low DHEA, low testosterone. Started compounded cream and also adrenal supplements. Had labs done from another provider and calcium was a little high. Also has been bleeding intermittently for about one month. Feels improvement in libido, mood and hot flashes. Still has been gaining weight and feeling frustrated about that, despite healthy diet and exercise habits.     No LMP recorded.      Past Medical History:   Diagnosis Date    Headache     Mitral valve prolapse      Past Surgical History:   Procedure Laterality Date    CERVICAL SPINE SURGERY N/A 2019    CERVICAL INTERLAMINAR SELIN C5-6 performed by Ben Valdez at [facility] OR    CERVICAL SPINE SURGERY N/A 2020    CERVICAL INTERLAMINAR SELIN C6-7 performed by Ben Valdez at [facility] OR    DILATION AND CURETTAGE OF UTERUS      EPIDURAL STEROID INJECTION N/A 2019    EPIDURAL STEROID INJECTION C6-7

## 2025-02-09 LAB
DHEA-S SERPL-MCNC: 11 UG/DL (ref 61–337)
THYROPEROXIDASE AB SERPL-ACNC: <0.3 IU/ML (ref 0–9)

## 2025-03-07 ENCOUNTER — HOSPITAL ENCOUNTER (OUTPATIENT)
Dept: PREADMISSION TESTING | Age: 45
Discharge: HOME | End: 2025-03-11

## 2025-03-13 DIAGNOSIS — G89.18 POSTOPERATIVE PAIN: Primary | ICD-10-CM

## 2025-03-13 RX ORDER — OXYCODONE AND ACETAMINOPHEN 5; 325 MG/1; MG/1
1 TABLET ORAL EVERY 6 HOURS PRN
Qty: 12 TABLET | Refills: 0 | Status: SHIPPED | OUTPATIENT
Start: 2025-03-13 | End: 2025-03-16

## 2025-03-13 ASSESSMENT — ENCOUNTER SYMPTOMS
ALLERGIC/IMMUNOLOGIC NEGATIVE: 1
GASTROINTESTINAL NEGATIVE: 1
EYES NEGATIVE: 1
RESPIRATORY NEGATIVE: 1

## 2025-03-13 NOTE — H&P
2025    Yolande Reyna (:  1980) is a 44 y.o. female, Established patient, here for evaluation of the following chief complaint(s):  No chief complaint on file.      Vitals:    25 1300   Weight: 72.6 kg (160 lb)   Height: 1.651 m (5' 5\")       Wt Readings from Last 3 Encounters:   01/15/25 72.6 kg (160 lb)   25 73.5 kg (162 lb)   24 70.3 kg (155 lb)       BP Readings from Last 3 Encounters:   01/15/25 120/70   25 118/72   24 98/63         SUBJECTIVE/OBJECTIVE:    Patient seen today for her nose.  She has a history of sinus surgery many years ago but she says she still gets occasional infections and she cannot breathe through her nose at all.  She had a CT scan which I reviewed which shows significant evidence of sinus surgery and turbinate hypertrophy.        Review of Systems   Constitutional: Negative.    HENT:  Positive for congestion.    Eyes: Negative.    Respiratory: Negative.     Cardiovascular: Negative.    Gastrointestinal: Negative.    Endocrine: Negative.    Musculoskeletal: Negative.    Skin: Negative.    Allergic/Immunologic: Negative.    Neurological: Negative.    Hematological: Negative.    Psychiatric/Behavioral: Negative.          Physical Exam  Vitals reviewed.   Constitutional:       Appearance: Normal appearance. She is normal weight.   HENT:      Head: Normocephalic and atraumatic.      Right Ear: Tympanic membrane, ear canal and external ear normal.      Left Ear: Tympanic membrane, ear canal and external ear normal.      Nose: Nose normal.      Right Turbinates: Enlarged.      Left Turbinates: Enlarged.      Mouth/Throat:      Mouth: Mucous membranes are moist.      Pharynx: Oropharynx is clear.   Eyes:      Extraocular Movements: Extraocular movements intact.      Pupils: Pupils are equal, round, and reactive to light.   Cardiovascular:      Rate and Rhythm: Normal rate and regular rhythm.   Pulmonary:      Effort: Pulmonary effort is  normal.      Breath sounds: Normal breath sounds.   Musculoskeletal:      Cervical back: Normal range of motion.   Skin:     General: Skin is warm and dry.   Neurological:      General: No focal deficit present.      Mental Status: She is alert and oriented to person, place, and time.   Psychiatric:         Mood and Affect: Mood normal.         Behavior: Behavior normal.              ASSESSMENT/PLAN:    1. Hypertrophy of nasal turbinates  Plan for turbinate ablation.  Risk and benefit discussed and she would like to proceed.    No follow-ups on file.    An electronic signature was used to authenticate this note.    Chad Paz MD       Please note that this chart was generated using dragon dictation software.  Although every effort was made to ensure the accuracy of this automated transcription, some errors in transcription may have occurred.

## 2025-03-14 ENCOUNTER — ANESTHESIA (OUTPATIENT)
Dept: OPERATING ROOM | Age: 45
End: 2025-03-14
Payer: COMMERCIAL

## 2025-03-14 ENCOUNTER — HOSPITAL ENCOUNTER (OUTPATIENT)
Age: 45
Setting detail: OUTPATIENT SURGERY
Discharge: HOME | End: 2025-03-14
Attending: OTOLARYNGOLOGY | Admitting: OTOLARYNGOLOGY
Payer: COMMERCIAL

## 2025-03-14 ENCOUNTER — ANESTHESIA EVENT (OUTPATIENT)
Dept: OPERATING ROOM | Age: 45
End: 2025-03-14
Payer: COMMERCIAL

## 2025-03-14 VITALS
WEIGHT: 160 LBS | DIASTOLIC BLOOD PRESSURE: 73 MMHG | BODY MASS INDEX: 26.66 KG/M2 | RESPIRATION RATE: 20 BRPM | TEMPERATURE: 97.9 F | HEIGHT: 65 IN | SYSTOLIC BLOOD PRESSURE: 99 MMHG | HEART RATE: 67 BPM | OXYGEN SATURATION: 97 %

## 2025-03-14 LAB
HCG, URINE, POC: NEGATIVE
Lab: NORMAL
NEGATIVE QC PASS/FAIL: NORMAL
POSITIVE QC PASS/FAIL: NORMAL

## 2025-03-14 PROCEDURE — 7100000001 HC PACU RECOVERY - ADDTL 15 MIN: Performed by: OTOLARYNGOLOGY

## 2025-03-14 PROCEDURE — 2500000003 HC RX 250 WO HCPCS: Performed by: ANESTHESIOLOGY

## 2025-03-14 PROCEDURE — 2500000003 HC RX 250 WO HCPCS

## 2025-03-14 PROCEDURE — 2580000003 HC RX 258: Performed by: ANESTHESIOLOGY

## 2025-03-14 PROCEDURE — 3700000000 HC ANESTHESIA ATTENDED CARE: Performed by: OTOLARYNGOLOGY

## 2025-03-14 PROCEDURE — 3600000004 HC SURGERY LEVEL 4 BASE: Performed by: OTOLARYNGOLOGY

## 2025-03-14 PROCEDURE — 30802 ABLATE INF TURBINATE SUBMUC: CPT | Performed by: OTOLARYNGOLOGY

## 2025-03-14 PROCEDURE — 2720000010 HC SURG SUPPLY STERILE: Performed by: OTOLARYNGOLOGY

## 2025-03-14 PROCEDURE — 7100000000 HC PACU RECOVERY - FIRST 15 MIN: Performed by: OTOLARYNGOLOGY

## 2025-03-14 PROCEDURE — 2709999900 HC NON-CHARGEABLE SUPPLY: Performed by: OTOLARYNGOLOGY

## 2025-03-14 PROCEDURE — 7100000011 HC PHASE II RECOVERY - ADDTL 15 MIN: Performed by: OTOLARYNGOLOGY

## 2025-03-14 PROCEDURE — 7100000010 HC PHASE II RECOVERY - FIRST 15 MIN: Performed by: OTOLARYNGOLOGY

## 2025-03-14 PROCEDURE — 6360000002 HC RX W HCPCS: Performed by: OTOLARYNGOLOGY

## 2025-03-14 PROCEDURE — 6370000000 HC RX 637 (ALT 250 FOR IP): Performed by: OTOLARYNGOLOGY

## 2025-03-14 PROCEDURE — 6360000002 HC RX W HCPCS: Performed by: ANESTHESIOLOGY

## 2025-03-14 PROCEDURE — 6360000002 HC RX W HCPCS

## 2025-03-14 RX ORDER — SODIUM CHLORIDE 0.9 % (FLUSH) 0.9 %
5-40 SYRINGE (ML) INJECTION EVERY 12 HOURS SCHEDULED
Status: DISCONTINUED | OUTPATIENT
Start: 2025-03-14 | End: 2025-03-14 | Stop reason: HOSPADM

## 2025-03-14 RX ORDER — SODIUM CHLORIDE, SODIUM LACTATE, POTASSIUM CHLORIDE, CALCIUM CHLORIDE 600; 310; 30; 20 MG/100ML; MG/100ML; MG/100ML; MG/100ML
INJECTION, SOLUTION INTRAVENOUS CONTINUOUS
Status: DISCONTINUED | OUTPATIENT
Start: 2025-03-14 | End: 2025-03-14 | Stop reason: HOSPADM

## 2025-03-14 RX ORDER — SODIUM CHLORIDE 0.9 % (FLUSH) 0.9 %
5-40 SYRINGE (ML) INJECTION PRN
Status: DISCONTINUED | OUTPATIENT
Start: 2025-03-14 | End: 2025-03-14 | Stop reason: HOSPADM

## 2025-03-14 RX ORDER — OXYMETAZOLINE HYDROCHLORIDE 0.05 G/100ML
SPRAY NASAL PRN
Status: DISCONTINUED | OUTPATIENT
Start: 2025-03-14 | End: 2025-03-14 | Stop reason: ALTCHOICE

## 2025-03-14 RX ORDER — LIDOCAINE HYDROCHLORIDE AND EPINEPHRINE 10; 10 MG/ML; UG/ML
INJECTION, SOLUTION INFILTRATION; PERINEURAL PRN
Status: DISCONTINUED | OUTPATIENT
Start: 2025-03-14 | End: 2025-03-14 | Stop reason: ALTCHOICE

## 2025-03-14 RX ORDER — DEXAMETHASONE SODIUM PHOSPHATE 10 MG/ML
INJECTION, SOLUTION INTRAMUSCULAR; INTRAVENOUS
Status: DISCONTINUED | OUTPATIENT
Start: 2025-03-14 | End: 2025-03-14 | Stop reason: SDUPTHER

## 2025-03-14 RX ORDER — DEXMEDETOMIDINE HYDROCHLORIDE 100 UG/ML
INJECTION, SOLUTION INTRAVENOUS
Status: DISCONTINUED | OUTPATIENT
Start: 2025-03-14 | End: 2025-03-14 | Stop reason: SDUPTHER

## 2025-03-14 RX ORDER — SODIUM CHLORIDE 9 MG/ML
INJECTION, SOLUTION INTRAVENOUS PRN
Status: DISCONTINUED | OUTPATIENT
Start: 2025-03-14 | End: 2025-03-14 | Stop reason: HOSPADM

## 2025-03-14 RX ORDER — DEXAMETHASONE SODIUM PHOSPHATE 4 MG/ML
4 INJECTION, SOLUTION INTRA-ARTICULAR; INTRALESIONAL; INTRAMUSCULAR; INTRAVENOUS; SOFT TISSUE ONCE
Status: COMPLETED | OUTPATIENT
Start: 2025-03-14 | End: 2025-03-14

## 2025-03-14 RX ORDER — NALOXONE HYDROCHLORIDE 0.4 MG/ML
INJECTION, SOLUTION INTRAMUSCULAR; INTRAVENOUS; SUBCUTANEOUS PRN
Status: DISCONTINUED | OUTPATIENT
Start: 2025-03-14 | End: 2025-03-14 | Stop reason: HOSPADM

## 2025-03-14 RX ORDER — ONDANSETRON 2 MG/ML
4 INJECTION INTRAMUSCULAR; INTRAVENOUS
Status: DISCONTINUED | OUTPATIENT
Start: 2025-03-14 | End: 2025-03-14 | Stop reason: HOSPADM

## 2025-03-14 RX ORDER — PROPOFOL 10 MG/ML
INJECTION, EMULSION INTRAVENOUS
Status: DISCONTINUED | OUTPATIENT
Start: 2025-03-14 | End: 2025-03-14 | Stop reason: SDUPTHER

## 2025-03-14 RX ORDER — FENTANYL CITRATE 50 UG/ML
INJECTION, SOLUTION INTRAMUSCULAR; INTRAVENOUS
Status: DISCONTINUED | OUTPATIENT
Start: 2025-03-14 | End: 2025-03-14 | Stop reason: SDUPTHER

## 2025-03-14 RX ORDER — LIDOCAINE HYDROCHLORIDE 10 MG/ML
INJECTION, SOLUTION EPIDURAL; INFILTRATION; INTRACAUDAL; PERINEURAL
Status: DISCONTINUED | OUTPATIENT
Start: 2025-03-14 | End: 2025-03-14 | Stop reason: SDUPTHER

## 2025-03-14 RX ORDER — HYDROMORPHONE HYDROCHLORIDE 1 MG/ML
0.25 INJECTION, SOLUTION INTRAMUSCULAR; INTRAVENOUS; SUBCUTANEOUS EVERY 5 MIN PRN
Status: DISCONTINUED | OUTPATIENT
Start: 2025-03-14 | End: 2025-03-14 | Stop reason: HOSPADM

## 2025-03-14 RX ORDER — ONDANSETRON 2 MG/ML
INJECTION INTRAMUSCULAR; INTRAVENOUS
Status: DISCONTINUED | OUTPATIENT
Start: 2025-03-14 | End: 2025-03-14 | Stop reason: SDUPTHER

## 2025-03-14 RX ORDER — HYDROMORPHONE HYDROCHLORIDE 1 MG/ML
0.5 INJECTION, SOLUTION INTRAMUSCULAR; INTRAVENOUS; SUBCUTANEOUS EVERY 5 MIN PRN
Status: DISCONTINUED | OUTPATIENT
Start: 2025-03-14 | End: 2025-03-14 | Stop reason: HOSPADM

## 2025-03-14 RX ADMIN — PROPOFOL 150 MG: 10 INJECTION, EMULSION INTRAVENOUS at 12:32

## 2025-03-14 RX ADMIN — LIDOCAINE HYDROCHLORIDE 50 MG: 10 INJECTION, SOLUTION EPIDURAL; INFILTRATION; INTRACAUDAL; PERINEURAL at 12:32

## 2025-03-14 RX ADMIN — SODIUM CHLORIDE, SODIUM LACTATE, POTASSIUM CHLORIDE, AND CALCIUM CHLORIDE: .6; .31; .03; .02 INJECTION, SOLUTION INTRAVENOUS at 10:25

## 2025-03-14 RX ADMIN — DEXAMETHASONE SODIUM PHOSPHATE 10 MG: 10 INJECTION, SOLUTION INTRAMUSCULAR; INTRAVENOUS at 12:39

## 2025-03-14 RX ADMIN — DEXMEDETOMIDINE HYDROCHLORIDE 10 MCG: 100 INJECTION, SOLUTION, CONCENTRATE INTRAVENOUS at 12:32

## 2025-03-14 RX ADMIN — SODIUM CHLORIDE, PRESERVATIVE FREE 20 MG: 5 INJECTION INTRAVENOUS at 11:07

## 2025-03-14 RX ADMIN — DEXAMETHASONE SODIUM PHOSPHATE 4 MG: 4 INJECTION INTRA-ARTICULAR; INTRALESIONAL; INTRAMUSCULAR; INTRAVENOUS; SOFT TISSUE at 11:07

## 2025-03-14 RX ADMIN — FENTANYL CITRATE 50 MCG: 0.05 INJECTION, SOLUTION INTRAMUSCULAR; INTRAVENOUS at 12:32

## 2025-03-14 RX ADMIN — ONDANSETRON 4 MG: 2 INJECTION INTRAMUSCULAR; INTRAVENOUS at 12:39

## 2025-03-14 ASSESSMENT — PAIN - FUNCTIONAL ASSESSMENT
PAIN_FUNCTIONAL_ASSESSMENT: NONE - DENIES PAIN
PAIN_FUNCTIONAL_ASSESSMENT: NONE - DENIES PAIN
PAIN_FUNCTIONAL_ASSESSMENT: 0-10

## 2025-03-14 ASSESSMENT — LIFESTYLE VARIABLES: SMOKING_STATUS: 1

## 2025-03-14 NOTE — ANESTHESIA PRE PROCEDURE
Department of Anesthesiology  Preprocedure Note       Name:  Yolande Reyna   Age:  44 y.o.  :  1980                                          MRN:  [id number]         Date:  3/14/2025      Surgeon: Surgeon(s):  Chad Paz MD    Procedure: Procedure(s):  Turbinate reduction.    Medications prior to admission:   Prior to Admission medications    Medication Sig Start Date End Date Taking? Authorizing Provider   PRASTERONE, DHEA, PO Apply 1 each topically daily PG40MG/GAZH4RS/T0.2MG/0.25ML  APPLY  1 CLICK (0.25 ML) TO LABIA AT BEDTIME 24  Yes Mansoor Olmedo MD   azelastine (ASTELIN) 0.1 % nasal spray 1 spray by Nasal route 2 times daily Use in each nostril as directed 12/3/24  Yes Virginia Serna APRN - CNP   liothyronine (CYTOMEL) 5 MCG tablet Take 1 tablet by mouth daily   Yes Mansoor Olmedo MD   pantoprazole (PROTONIX) 40 MG tablet Take 1 tablet by mouth every morning (before breakfast) 24  Yes Sonja Bacon APRN - CNP   escitalopram (LEXAPRO) 20 MG tablet Take 1 tablet by mouth daily   Yes ProviderMansoor MD   VITAMIN D, CHOLECALCIFEROL, PO Take 5,000 Units by mouth daily   Yes Mansoor Olmedo MD   levocetirizine (XYZAL) 5 MG tablet Take 1 tablet by mouth nightly   Yes Mansoor Olmedo MD   butalbital-acetaminophen-caffeine (FIORICET, ESGIC) -40 MG per tablet Take 1 tablet by mouth every 4 hours as needed for Headaches   Yes Mansoor Olmedo MD   levothyroxine (SYNTHROID) 75 MCG tablet Take 1 tablet by mouth Daily   Yes Mansoor Olmedo MD   cyanocobalamin 1000 MCG/ML injection Inject 1 mL into the muscle every 14 days   Yes Mansoor Olmedo MD   ALPRAZolam (XANAX PO) Take 0.25 mg by mouth as needed (anxiety)   Yes Mansoor Olmedo MD   busPIRone HCl (BUSPAR PO) Take 10 mg by mouth daily   Yes Mansoor Olmedo MD   oxyCODONE-acetaminophen (ENDOCET) 5-325 MG per tablet Take 1 tablet by mouth every 6 hours as needed for Pain

## 2025-03-14 NOTE — DISCHARGE INSTRUCTIONS
Please call Dr. Paz with questions or concerns.  Expect some bleeding for a few days.  Follow-up in about a month.  Take pain meds as needed.

## 2025-03-14 NOTE — ANESTHESIA POSTPROCEDURE EVALUATION
Department of Anesthesiology  Postprocedure Note    Patient: Yolande Reyna  MRN: [id number]  YOB: 1980  Date of evaluation: 3/14/2025    Procedure Summary       Date: 03/14/25 Room / Location: [address]    Anesthesia Start: 1228 Anesthesia Stop: 1247    Procedure: Turbinate reduction. (Bilateral) Diagnosis:       Hypertrophy of nasal turbinates      (Hypertrophy of nasal turbinates [J34.3])    Surgeons: Chad Paz MD Responsible Provider: Sharda Harris APRN - CRNA    Anesthesia Type: general ASA Status: 2            Anesthesia Type: No value filed.    Monique Phase I: Monique Score: 10    Monique Phase II:      Anesthesia Post Evaluation    Patient location during evaluation: PACU  Patient participation: waiting for patient participation  Level of consciousness: sleepy but conscious  Pain score: 0  Airway patency: patent  Nausea & Vomiting: no nausea and no vomiting  Cardiovascular status: hemodynamically stable  Respiratory status: acceptable and spontaneous ventilation  Hydration status: stable  Comments: BP 98/64   Pulse 77   Temp 98.1 °F (36.7 °C)   Resp 18   Ht 1.651 m (5' 5\")   Wt 72.6 kg (160 lb)   LMP 02/28/2025 (Approximate)   SpO2 94%   BMI 26.63 kg/m²     Pain management: adequate    No notable events documented.

## 2025-03-14 NOTE — BRIEF OP NOTE
Brief Postoperative Note      Patient: Yolande Reyna  YOB: 1980  MRN: [id number]    Date of Procedure: 3/14/2025    Pre-Op Diagnosis Codes:      * Hypertrophy of nasal turbinates [J34.3]    Post-Op Diagnosis: Same       Procedure(s):  Turbinate reduction.    Surgeon(s):  Chad Paz MD    Assistant:  * No surgical staff found *    Anesthesia: Choice    Estimated Blood Loss (mL): Minimal    Complications: None    Specimens:   * No specimens in log *    Implants:  * No implants in log *      Drains: * No LDAs found *    Findings:  Infection Present At Time Of Surgery (PATOS) (choose all levels that have infection present):  No infection present  Other Findings: turbinate hyptrophy    Electronically signed by Chad Paz MD on 3/14/2025 at 12:43 PM

## 2025-03-14 NOTE — OP NOTE
Operative Note      Patient: Yolande Reyna  YOB: 1980  MRN: [id number]    Date of Procedure: 3/14/2025    Pre-Op Diagnosis Codes:      * Hypertrophy of nasal turbinates [J34.3]    Post-Op Diagnosis: Same       Procedure(s):  Turbinate reduction.    Surgeon(s):  Chad Paz MD    Assistant:   * No surgical staff found *    Anesthesia: Choice    Estimated Blood Loss (mL): Minimal    Complications: None    Specimens:   * No specimens in log *    Implants:  * No implants in log *      Drains: * No LDAs found *    Findings:  Infection Present At Time Of Surgery (PATOS) (choose all levels that have infection present):  No infection present  Other Findings: Turbinate hypertrophy    Detailed Description of Procedure:   After obtaining informed consent, the patient was taken to the operating room and placed op table supine position.  After induction of general LMA anesthesia the patient was prepped in standard fashion for turbinate reduction.  Once a timeout was performed each inferior turbinate was injected with lidocaine with epinephrine.  Once took effect the wand was started to the left inferior turbinate and activated for 20 seconds.  It was then reapproximated activated again.  Once complete the treatment was outfractured.  The right for term was addressed similar fashion.  Afrin-soaked pledgets were then placed bilaterally and the patient was returned to anesthesia having suffered no complications.    Electronically signed by Chad Paz MD on 3/14/2025 at 12:50 PM

## 2025-03-14 NOTE — INTERVAL H&P NOTE
Update History & Physical    The patient's History and Physical of February 20, 2025 was reviewed with the patient and I examined the patient. There was no change. The surgical site was confirmed by the patient and me.     Plan: The risks, benefits, expected outcome, and alternative to the recommended procedure have been discussed with the patient. Patient understands and wants to proceed with the procedure.     Electronically signed by Chad Paz MD on 3/14/2025 at 11:04 AM

## 2025-04-14 ENCOUNTER — OFFICE VISIT (OUTPATIENT)
Dept: ENT CLINIC | Age: 45
End: 2025-04-14

## 2025-04-14 VITALS
BODY MASS INDEX: 27.16 KG/M2 | SYSTOLIC BLOOD PRESSURE: 102 MMHG | HEIGHT: 65 IN | WEIGHT: 163 LBS | DIASTOLIC BLOOD PRESSURE: 64 MMHG

## 2025-04-14 DIAGNOSIS — J34.3 HYPERTROPHY OF NASAL TURBINATES: Primary | ICD-10-CM

## 2025-04-14 PROCEDURE — 99024 POSTOP FOLLOW-UP VISIT: CPT | Performed by: OTOLARYNGOLOGY

## 2025-04-14 ASSESSMENT — ENCOUNTER SYMPTOMS
EYES NEGATIVE: 1
ALLERGIC/IMMUNOLOGIC NEGATIVE: 1
RESPIRATORY NEGATIVE: 1
GASTROINTESTINAL NEGATIVE: 1

## 2025-04-14 NOTE — PROGRESS NOTES
warm and dry.   Neurological:      General: No focal deficit present.      Mental Status: She is alert and oriented to person, place, and time.   Psychiatric:         Mood and Affect: Mood normal.         Behavior: Behavior normal.              ASSESSMENT/PLAN:    1. Hypertrophy of nasal turbinates  Turbinates look great.  She can follow-up as needed.    Return if symptoms worsen or fail to improve.    An electronic signature was used to authenticate this note.    Chad Paz MD       Please note that this chart was generated using dragon dictation software.  Although every effort was made to ensure the accuracy of this automated transcription, some errors in transcription may have occurred.

## 2025-05-09 ENCOUNTER — RESULTS FOLLOW-UP (OUTPATIENT)
Dept: OBGYN CLINIC | Age: 45
End: 2025-05-09

## 2025-05-09 DIAGNOSIS — Z12.31 ENCOUNTER FOR SCREENING MAMMOGRAM FOR MALIGNANT NEOPLASM OF BREAST: ICD-10-CM

## 2025-06-05 ENCOUNTER — TELEPHONE (OUTPATIENT)
Dept: OBGYN CLINIC | Age: 45
End: 2025-06-05

## 2025-06-05 NOTE — TELEPHONE ENCOUNTER
Patient called requesting to switch providers due to her condition. Patient feels like  would be better for her.

## 2025-06-12 ENCOUNTER — TELEPHONE (OUTPATIENT)
Dept: GASTROENTEROLOGY | Age: 45
End: 2025-06-12

## 2025-06-12 NOTE — TELEPHONE ENCOUNTER
Called patient to remind them of their procedure with Dr. Orlando Garcia  at  on 6/17/25 to arrive at 7AM    CONFIRMED    PATIENT HAS INSTR & PRESCRIPTION

## 2025-06-17 ENCOUNTER — ANESTHESIA EVENT (OUTPATIENT)
Dept: OPERATING ROOM | Age: 45
End: 2025-06-17

## 2025-06-17 ENCOUNTER — HOSPITAL ENCOUNTER (OUTPATIENT)
Age: 45
Setting detail: OUTPATIENT SURGERY
Discharge: HOME | End: 2025-06-17
Attending: INTERNAL MEDICINE | Admitting: INTERNAL MEDICINE
Payer: COMMERCIAL

## 2025-06-17 ENCOUNTER — ANESTHESIA (OUTPATIENT)
Dept: OPERATING ROOM | Age: 45
End: 2025-06-17

## 2025-06-17 ENCOUNTER — HOSPITAL ENCOUNTER (OUTPATIENT)
Age: 45
Setting detail: SPECIMEN
Discharge: HOME | End: 2025-06-17
Payer: COMMERCIAL

## 2025-06-17 ENCOUNTER — APPOINTMENT (OUTPATIENT)
Dept: OPERATING ROOM | Age: 45
End: 2025-06-17
Attending: INTERNAL MEDICINE

## 2025-06-17 VITALS
SYSTOLIC BLOOD PRESSURE: 105 MMHG | BODY MASS INDEX: 26.99 KG/M2 | HEIGHT: 65 IN | WEIGHT: 162 LBS | HEART RATE: 84 BPM | OXYGEN SATURATION: 100 % | TEMPERATURE: 97.7 F | DIASTOLIC BLOOD PRESSURE: 71 MMHG | RESPIRATION RATE: 14 BRPM

## 2025-06-17 LAB
HCG URINE CONTROL, POC: NORMAL
LOT NUMBER POC: NORMAL
PREGNANCY TEST URINE, POC: NORMAL

## 2025-06-17 PROCEDURE — 88305 TISSUE EXAM BY PATHOLOGIST: CPT

## 2025-06-17 PROCEDURE — 45385 COLONOSCOPY W/LESION REMOVAL: CPT | Performed by: INTERNAL MEDICINE

## 2025-06-17 PROCEDURE — 45385 COLONOSCOPY W/LESION REMOVAL: CPT

## 2025-06-17 RX ORDER — LIDOCAINE HYDROCHLORIDE 10 MG/ML
INJECTION, SOLUTION EPIDURAL; INFILTRATION; INTRACAUDAL; PERINEURAL
Status: DISCONTINUED | OUTPATIENT
Start: 2025-06-17 | End: 2025-06-17 | Stop reason: SDUPTHER

## 2025-06-17 RX ORDER — SODIUM CHLORIDE, SODIUM LACTATE, POTASSIUM CHLORIDE, CALCIUM CHLORIDE 600; 310; 30; 20 MG/100ML; MG/100ML; MG/100ML; MG/100ML
INJECTION, SOLUTION INTRAVENOUS CONTINUOUS
Status: DISCONTINUED | OUTPATIENT
Start: 2025-06-17 | End: 2025-06-17 | Stop reason: HOSPADM

## 2025-06-17 RX ORDER — PROPOFOL 10 MG/ML
INJECTION, EMULSION INTRAVENOUS
Status: DISCONTINUED | OUTPATIENT
Start: 2025-06-17 | End: 2025-06-17 | Stop reason: SDUPTHER

## 2025-06-17 RX ADMIN — SODIUM CHLORIDE, SODIUM LACTATE, POTASSIUM CHLORIDE, CALCIUM CHLORIDE: 600; 310; 30; 20 INJECTION, SOLUTION INTRAVENOUS at 07:14

## 2025-06-17 RX ADMIN — LIDOCAINE HYDROCHLORIDE 50 MG: 10 INJECTION, SOLUTION EPIDURAL; INFILTRATION; INTRACAUDAL; PERINEURAL at 08:27

## 2025-06-17 RX ADMIN — PROPOFOL 200 MG: 10 INJECTION, EMULSION INTRAVENOUS at 08:27

## 2025-06-17 ASSESSMENT — PAIN - FUNCTIONAL ASSESSMENT
PAIN_FUNCTIONAL_ASSESSMENT: NONE - DENIES PAIN
PAIN_FUNCTIONAL_ASSESSMENT: NONE - DENIES PAIN

## 2025-06-17 NOTE — OP NOTE
Patient: Yolande Reyna : 1980  Med Rec#: [id number] Acc#: [id number]   Primary Care Provider Stacy Mauro DO    Date of Procedure:  2025    Endoscopist: Orlando Garcia MD, MD    Referring Provider: Stacy Mauro DO,     Operation Performed: Colonoscopy up to the distal terminal ileum with    Hot snare resection of a transverse colon polyp    Indications: Colon cancer screening    Anesthesia:  Sedation was administered by anesthesia who monitored the patient during the procedure.    I met with Yolande Reyna prior to procedure. We discussed the procedure itself, and I have discussed the risks of endoscopy (including-- but not limited to-- pain, discomfort, bleeding potentially requiring second endoscopic procedure and/or blood transfusion, organ perforation requiring operative repair, damage to organs near the colon, infection, aspiration, cardiopulmonary/allergic reaction), benefits, indications to endoscopy. Additionally, we discussed options other than colonoscopy. The patient expressed understanding. All questions answered. The patient decided to proceed with the procedure.  Signed informed consent was placed on the chart.    Blood Loss: minimal    Withdrawal time: More than 9 minutes  Bowel Prep: Fair  with small amounts of thick semisolid stool and a moderate amount of thick, opaque liquid scattered in patchy segments throughout the colon obscuring the underlying mucosa.Lesions including polyps may have been missed.     Complications: no immediate complications    DESCRIPTION OF PROCEDURE:     A time out was performed. After written informed consent was obtained, the patient was placed in the left lateral position.     The perianal area was inspected, and a digital rectal exam was performed. A rectal exam was performed: normal tone, no palpable lesions. At this point, a forward viewing Olympus colonoscope was inserted into the anus and carefully advanced to  the distal terminal ileum.  The cecum was identified by the ileocecal valve and the appendiceal orifice. The colonoscope was then slowly withdrawn with careful inspection of the mucosa in a linear and circumferential fashion. The scope was retroflexed in the rectum. Suction was utilized during the procedure to remove as much air as possible from the bowel. The colonoscope was removed from the patient, and the procedure was terminated.  Findings are listed below.        Findings:   A 7 mm in diameter sessile flat benign-appearing transverse colon polyp was removed by hot snare polypectomy.    NO larger polyps or masses or strictures or colitis or terminal ileitis.  Suboptimal exam due to prep quality as described above.    Mild diverticulosis in the left colon  Small perianal tags on the retroflexed exam in the rectum  Where it was clearly visible, the mucosa appeared normal throughout the entire examined colon  Retroflexion in the rectum was otherwise normal and revealed no further abnormalities      Recommendations:  1. Repeat colonoscopy: pending pathology -in 5 years for surveillance; sooner if her personal or family history as pertaining to colorectal cancer risk changes requiring an earlier exam or if the patient were to develop lower GI symptoms such as bleeding, abdominal pain, change in bowel habits or stool caliber or if the patient has anemia or unexplained weight loss in the future.   2. Await biopsy results-you will receive a letter with your results within 7-10 days    - Resume previous meds and diet  - GI clinic f/u PRN   - Keep scheduled f/u appts with other MDs     - NO ASA/NSAIDs x 2 weeks     Findings and recommendations were discussed w/ the patient. A copy of the images was provided.    (Please note that portions of this note were completed with a voice recognition program. Efforts were made to edit the dictations but occasionally words may be mis-transcribed.)     Orlando Garcia MD,

## 2025-06-17 NOTE — ANESTHESIA PRE PROCEDURE
Department of Anesthesiology  Preprocedure Note       Name:  Yolande Reyna   Age:  45 y.o.  :  1980                                          MRN:  [id number]         Date:  2025      Surgeon: Surgeon(s):  Orlando Garcia MD    Procedure: Procedure(s):  COLORECTAL CANCER SCREENING, NOT HIGH RISK    Medications prior to admission:   Prior to Admission medications    Medication Sig Start Date End Date Taking? Authorizing Provider   PRASTERONE, DHEA, PO Apply 1 each topically daily PG40MG/AHRN7DX/T0.2MG/0.25ML  APPLY  1 CLICK (0.25 ML) TO LABIA AT BEDTIME 24   Mansoor Olmedo MD   azelastine (ASTELIN) 0.1 % nasal spray 1 spray by Nasal route 2 times daily Use in each nostril as directed 12/3/24   Virginia Serna APRN - CNP   liothyronine (CYTOMEL) 5 MCG tablet Take 1 tablet by mouth daily    Mansoor Olmedo MD   pantoprazole (PROTONIX) 40 MG tablet Take 1 tablet by mouth every morning (before breakfast) 24   Sonja Bacon APRN - CNP   escitalopram (LEXAPRO) 20 MG tablet Take 1 tablet by mouth daily    Mansoor Olmedo MD   VITAMIN D, CHOLECALCIFEROL, PO Take 5,000 Units by mouth daily    Mansoor Olmedo MD   levocetirizine (XYZAL) 5 MG tablet Take 1 tablet by mouth nightly    Mansoor Olmedo MD   butalbital-acetaminophen-caffeine (FIORICET, ESGIC) -40 MG per tablet Take 1 tablet by mouth every 4 hours as needed for Headaches    Mansoor Olmedo MD   levothyroxine (SYNTHROID) 75 MCG tablet Take 1 tablet by mouth Daily    Mansoor Olmedo MD   cyanocobalamin 1000 MCG/ML injection Inject 1 mL into the muscle every 14 days    Mansoor Olmedo MD   ALPRAZolam (XANAX PO) Take 0.25 mg by mouth as needed (anxiety)    Mansoor Olmedo MD   busPIRone HCl (BUSPAR PO) Take 10 mg by mouth daily    Mansoor Olmedo MD       Current medications:    No current facility-administered medications for this encounter.     Current Outpatient

## 2025-06-17 NOTE — DISCHARGE INSTRUCTIONS
1. Repeat colonoscopy: pending pathology -in 5 years for surveillance; sooner if her personal or family history as pertaining to colorectal cancer risk changes requiring an earlier exam or if the patient were to develop lower GI symptoms such as bleeding, abdominal pain, change in bowel habits or stool caliber or if the patient has anemia or unexplained weight loss in the future.   2. Await biopsy results-you will receive a letter with your results within 7-10 days  - Resume previous meds and diet  - GI clinic f/u PRN   - Keep scheduled f/u appts with other MDs   - NO ASA/NSAIDs x 2 weeks     POST-OP ORDERS: ENDOSCOPY & COLONOSCOPY:  1. Rest today.  2. DO NOT eat or drink until wide awake; eat your usual diet today in moderate amount only.  3. DO NOT drive today.  4. Call physician if you have severe pain, vomiting, fever, rectal bleeding or black bowel movements.  5.  If a biopsy was taken or a polyp removed, you should expect to hear results in about 21 days.  If you have heard nothing from your physician by then, call the office for results.  6.  Discharge home when patient awake, vitals signs stable and tolerating liquids.  7. Call with questions or concerns [phone].    NSAIDS (Non-steroidal Anti-Inflammatory)  You have been directed by your physician to avoid any NSAID's; the following medications are a list of those to avoid x 2 weeks. If you think that you are taking any NSAID's notify your physician.     Over The Counter    Advil                      Motrin  Nuprin                   Ibuprofen  Midol                     Aleve  Naproxen              Orudis  Aspirin                   [place]    Prescriptions and Generics    Cataflam              Relafen  Voltaren               Clinoril  Indocin                 Naproxen  Arthrotec              Lodine  Daypro                 Nalfon  Toradol                Ansaid  Feldene               Meclofenamate  Fenoprofen          Ponstel  Mobic

## 2025-06-17 NOTE — H&P
Patient Name: Yolande Reyna  : 1980  MRN: [id number]  DATE: 25    Allergies:   Allergies   Allergen Reactions    Fish Allergy Nausea And Vomiting        ENDOSCOPY  History and Physical    Procedure:    [] Diagnostic Colonoscopy       [x] Screening Colonoscopy  [] EGD      [] ERCP      [] EUS       [] Other    [x] Previous office notes/History and Physical reviewed from the patients chart. Please see EMR for further details of HPI. I have examined the patient's status immediately prior to the procedure and:      Indications/HPI:    []Abdominal Pain   []Cancer- GI/Lung     []Fhx of colon CA/polyps  []History of Polyps  []Barretts            []Melena  []Abnormal Imaging              []Dysphagia              []Persistent Pneumonia   []Anemia                            []Food Impaction        []History of Polyps  [] GI Bleed             []Pulmonary nodule/Mass   []Change in bowel habits []Heartburn/Reflux  []Rectal Bleed (BRBPR)  []Chest Pain - Non Cardiac []Heme (+) Stool []Ulcers  []Constipation  []Hemoptysis  []Varices  []Diarrhea  []Hypoxemia    []Nausea/Vomiting   [x]Screening   []Crohns/Colitis  []Other:     Anesthesia:   [x] MAC [] Moderate Sedation   [] General   [] None     ROS: 12 pt Review of Symptoms was negative unless mentioned above    Medications:   Prior to Admission medications    Medication Sig Start Date End Date Taking? Authorizing Provider   PRASTERONE, DHEA, PO Apply 1 each topically daily PG40MG/TZPI3RK/T0.2MG/0.25ML  APPLY  1 CLICK (0.25 ML) TO LABIA AT BEDTIME 24  Yes Mansoor Olmedo MD   azelastine (ASTELIN) 0.1 % nasal spray 1 spray by Nasal route 2 times daily Use in each nostril as directed 12/3/24  Yes Virginia Serna APRN - CNP   liothyronine (CYTOMEL) 5 MCG tablet Take 1 tablet by mouth daily   Yes Mansoor Olmedo MD   pantoprazole (PROTONIX) 40 MG tablet Take 1 tablet by mouth every morning (before breakfast) 24  Yes Sonja Bacon APRN - CNP

## 2025-06-17 NOTE — ANESTHESIA POSTPROCEDURE EVALUATION
Department of Anesthesiology  Postprocedure Note    Patient: Yolande Reyna  MRN: [id number]  YOB: 1980  Date of evaluation: 6/17/2025    Procedure Summary       Date: 06/17/25 Room / Location: [place] [address] / [facility] [place]    Anesthesia Start: 0821 Anesthesia Stop:     Procedure: COLORECTAL CANCER SCREENING, NOT HIGH RISK (Abdomen) Diagnosis:       Screen for colon cancer      (Screen for colon cancer [Z12.11])    Surgeons: Orlando Garcia MD Responsible Provider: Alice Cheney APRN - CRNA    Anesthesia Type: general, TIVA ASA Status: 1            Anesthesia Type: No value filed.    Monique Phase I: Monique Score: 10    Monique Phase II:      Anesthesia Post Evaluation    Patient location during evaluation: bedside  Patient participation: complete - patient participated  Level of consciousness: responsive to physical stimuli  Pain score: 0  Airway patency: patent  Nausea & Vomiting: no nausea and no vomiting  Cardiovascular status: blood pressure returned to baseline  Respiratory status: acceptable, room air and spontaneous ventilation  Hydration status: euvolemic  Pain management: adequate    No notable events documented.

## 2025-06-18 ENCOUNTER — RESULTS FOLLOW-UP (OUTPATIENT)
Dept: GASTROENTEROLOGY | Age: 45
End: 2025-06-18

## 2025-06-18 SDOH — ECONOMIC STABILITY: INCOME INSECURITY: IN THE LAST 12 MONTHS, WAS THERE A TIME WHEN YOU WERE NOT ABLE TO PAY THE MORTGAGE OR RENT ON TIME?: NO

## 2025-06-18 SDOH — ECONOMIC STABILITY: TRANSPORTATION INSECURITY
IN THE PAST 12 MONTHS, HAS THE LACK OF TRANSPORTATION KEPT YOU FROM MEDICAL APPOINTMENTS OR FROM GETTING MEDICATIONS?: NO

## 2025-06-18 SDOH — ECONOMIC STABILITY: FOOD INSECURITY: WITHIN THE PAST 12 MONTHS, THE FOOD YOU BOUGHT JUST DIDN'T LAST AND YOU DIDN'T HAVE MONEY TO GET MORE.: NEVER TRUE

## 2025-06-18 SDOH — ECONOMIC STABILITY: FOOD INSECURITY: WITHIN THE PAST 12 MONTHS, YOU WORRIED THAT YOUR FOOD WOULD RUN OUT BEFORE YOU GOT MONEY TO BUY MORE.: NEVER TRUE

## 2025-06-18 SDOH — ECONOMIC STABILITY: TRANSPORTATION INSECURITY
IN THE PAST 12 MONTHS, HAS LACK OF TRANSPORTATION KEPT YOU FROM MEETINGS, WORK, OR FROM GETTING THINGS NEEDED FOR DAILY LIVING?: NO

## 2025-06-18 ASSESSMENT — PATIENT HEALTH QUESTIONNAIRE - PHQ9
2. FEELING DOWN, DEPRESSED OR HOPELESS: NOT AT ALL
SUM OF ALL RESPONSES TO PHQ QUESTIONS 1-9: 0
1. LITTLE INTEREST OR PLEASURE IN DOING THINGS: NOT AT ALL
SUM OF ALL RESPONSES TO PHQ9 QUESTIONS 1 & 2: 0
SUM OF ALL RESPONSES TO PHQ QUESTIONS 1-9: 0
2. FEELING DOWN, DEPRESSED OR HOPELESS: NOT AT ALL
SUM OF ALL RESPONSES TO PHQ QUESTIONS 1-9: 0
1. LITTLE INTEREST OR PLEASURE IN DOING THINGS: NOT AT ALL
SUM OF ALL RESPONSES TO PHQ QUESTIONS 1-9: 0

## 2025-06-19 ENCOUNTER — OFFICE VISIT (OUTPATIENT)
Dept: OBGYN CLINIC | Age: 45
End: 2025-06-19
Payer: COMMERCIAL

## 2025-06-19 VITALS
SYSTOLIC BLOOD PRESSURE: 110 MMHG | HEIGHT: 65 IN | BODY MASS INDEX: 26.66 KG/M2 | WEIGHT: 160 LBS | HEART RATE: 103 BPM | DIASTOLIC BLOOD PRESSURE: 80 MMHG

## 2025-06-19 DIAGNOSIS — N81.4 UTERINE PROLAPSE: ICD-10-CM

## 2025-06-19 DIAGNOSIS — N93.9 VAGINA BLEEDING: ICD-10-CM

## 2025-06-19 DIAGNOSIS — N85.2 UTERINE ENLARGEMENT: ICD-10-CM

## 2025-06-19 DIAGNOSIS — N81.2 CYSTOCELE WITH SECOND DEGREE UTERINE PROLAPSE: ICD-10-CM

## 2025-06-19 DIAGNOSIS — N92.3 INTERMENSTRUAL BLEEDING: Primary | ICD-10-CM

## 2025-06-19 PROCEDURE — 99214 OFFICE O/P EST MOD 30 MIN: CPT | Performed by: OBSTETRICS & GYNECOLOGY

## 2025-06-19 NOTE — PROGRESS NOTES
procedure under anesthesia    2. Uterine prolapse.  Grade 2 to 3 uterine prolapse noted during examination. Informed that the condition could progress with age. Pelvic floor therapy recommended as a non-surgical option to manage symptoms. If symptoms worsen or become bothersome, referral to a gynecologist for potential surgical intervention, such as a hysterectomy, will be considered.    - Recommend pelvic floor therapy  - Discussed potential progression with age  - Consider referral to urogynecologist for surgical intervention if symptoms worsen    3. Bladder prolapse.  Grade 2 to 3 bladder prolapse noted. Advised that pelvic floor therapy could help manage this condition as well. If symptoms persist or worsen, further evaluation and treatment by a gynecologist may be necessary.    - Recommend pelvic floor therapy  - Discussed potential progression with age  - Consider referral to gynecologist for further evaluation if symptoms worsen    Follow-up:  Schedule hysteroscopy and D & C.      No follow-ups on file.         The patient (or guardian, if applicable) and other individuals in attendance with the patient were advised that Artificial Intelligence will be utilized during this visit to record, process the conversation to generate a clinical note and to support improvement of the AI technology. The patient (or guardian, if applicable) and other individuals in attendance at the appointment consented to the use of AI, including the recording.      An electronic signature was used to authenticate this note.    --Amy Uriostegui MD

## 2025-06-25 ENCOUNTER — OFFICE VISIT (OUTPATIENT)
Dept: OBGYN CLINIC | Age: 45
End: 2025-06-25
Payer: COMMERCIAL

## 2025-06-25 ENCOUNTER — HOSPITAL ENCOUNTER (OUTPATIENT)
Dept: ULTRASOUND IMAGING | Age: 45
Discharge: HOME | End: 2025-06-25
Attending: OBSTETRICS & GYNECOLOGY
Payer: COMMERCIAL

## 2025-06-25 DIAGNOSIS — N85.2 UTERINE ENLARGEMENT: ICD-10-CM

## 2025-06-25 DIAGNOSIS — N92.3 INTERMENSTRUAL BLEEDING: Primary | ICD-10-CM

## 2025-06-25 DIAGNOSIS — Z01.818 PRE-OP EXAM: ICD-10-CM

## 2025-06-25 DIAGNOSIS — N92.3 INTERMENSTRUAL BLEEDING: ICD-10-CM

## 2025-06-25 PROCEDURE — 76830 TRANSVAGINAL US NON-OB: CPT

## 2025-06-25 PROCEDURE — 99212 OFFICE O/P EST SF 10 MIN: CPT | Performed by: OBSTETRICS & GYNECOLOGY

## 2025-06-25 NOTE — PROGRESS NOTES
Yolande Reyna is a 45 y.o. Pt who presents today for pre op for hysteroscopy d&c, intermenstrual bleeding, uterine enlargment      Past Medical History:   Diagnosis Date    Headache     Mitral valve prolapse        Past Surgical History:   Procedure Laterality Date    CERVICAL SPINE SURGERY N/A 11/12/2019    CERVICAL INTERLAMINAR SELIN C5-6 performed by Ben Valdez at [facility] OR    CERVICAL SPINE SURGERY N/A 03/17/2020    CERVICAL INTERLAMINAR SELIN C6-7 performed by Ben Valdez at [facility] OR    COLONOSCOPY N/A 06/17/2025    Duplicate    COLONOSCOPY N/A 06/17/2025    Dr Garcia, Benign HP, mild diverticulosis left colon, sm perianal tags on the retroflexed exam in rectum, 5 year recall    DILATION AND CURETTAGE OF UTERUS      EPIDURAL STEROID INJECTION N/A 12/17/2019    EPIDURAL STEROID INJECTION C6-7 performed by Ben Valdez at [facility] OR    ESOPHAGEAL DILATATION N/A 12/04/2024    Dr Garcia, Benítez 54 fr dil    FACIAL SURGERY      reconstruction, has metal in face     NOSE SURGERY Bilateral 03/14/2025    Turbinate reduction. performed by Chad Paz MD at [facility] OR    SINUS SURGERY      2004    UPPER GASTROINTESTINAL ENDOSCOPY N/A 12/04/2024    Dr Garcia, W 54 fr dil, (-) EOE, (-) h pylori, sugg of mod chemical gastritis,       Family History   Problem Relation Age of Onset    Colon Polyps Mother     Colon Polyps Father     Crohn's Disease Father     Crohn's Disease Paternal Aunt     Crohn's Disease Paternal Aunt     Colon Cancer Neg Hx     Liver Cancer Neg Hx        Social History     Socioeconomic History    Marital status:      Spouse name: Not on file    Number of children: Not on file    Years of education: Not on file    Highest education level: Not on file   Occupational History    Not on file   Tobacco Use    Smoking status: Former     Current packs/day: 1.00     Types: Cigarettes    Smokeless tobacco: Never   Vaping Use    Vaping status: Every Day    Substances:

## 2025-06-26 ENCOUNTER — TELEPHONE (OUTPATIENT)
Dept: OBGYN CLINIC | Age: 45
End: 2025-06-26

## 2025-06-26 ENCOUNTER — PREP FOR PROCEDURE (OUTPATIENT)
Dept: OBGYN CLINIC | Age: 45
End: 2025-06-26

## 2025-06-26 DIAGNOSIS — N85.2 HYPERTROPHY OF UTERUS: ICD-10-CM

## 2025-06-26 DIAGNOSIS — N93.9 ABNORMAL UTERINE BLEEDING (AUB): ICD-10-CM

## 2025-06-27 RX ORDER — SODIUM CHLORIDE 0.9 % (FLUSH) 0.9 %
5-40 SYRINGE (ML) INJECTION PRN
Status: CANCELLED | OUTPATIENT
Start: 2025-06-27

## 2025-06-27 RX ORDER — SODIUM CHLORIDE 9 MG/ML
INJECTION, SOLUTION INTRAVENOUS PRN
Status: CANCELLED | OUTPATIENT
Start: 2025-06-27

## 2025-06-27 RX ORDER — SODIUM CHLORIDE 0.9 % (FLUSH) 0.9 %
5-40 SYRINGE (ML) INJECTION EVERY 12 HOURS SCHEDULED
Status: CANCELLED | OUTPATIENT
Start: 2025-06-27

## 2025-06-27 RX ORDER — SODIUM CHLORIDE, SODIUM LACTATE, POTASSIUM CHLORIDE, CALCIUM CHLORIDE 600; 310; 30; 20 MG/100ML; MG/100ML; MG/100ML; MG/100ML
INJECTION, SOLUTION INTRAVENOUS CONTINUOUS
Status: CANCELLED | OUTPATIENT
Start: 2025-06-27

## 2025-06-30 ENCOUNTER — HOSPITAL ENCOUNTER (OUTPATIENT)
Dept: PREADMISSION TESTING | Age: 45
Discharge: HOME | End: 2025-07-04
Payer: COMMERCIAL

## 2025-06-30 VITALS — HEIGHT: 65 IN | WEIGHT: 161 LBS | BODY MASS INDEX: 26.82 KG/M2

## 2025-06-30 LAB
ERYTHROCYTE [DISTWIDTH] IN BLOOD BY AUTOMATED COUNT: 13.2 % (ref 11.5–14.5)
HCT VFR BLD AUTO: 42.6 % (ref 37–47)
HGB BLD-MCNC: 14 G/DL (ref 12–16)
MCH RBC QN AUTO: 29.8 PG (ref 27–31)
MCHC RBC AUTO-ENTMCNC: 32.9 G/DL (ref 33–37)
MCV RBC AUTO: 90.6 FL (ref 81–99)
PLATELET # BLD AUTO: 321 K/UL (ref 130–400)
PMV BLD AUTO: 9.7 FL (ref 9.4–12.3)
RBC # BLD AUTO: 4.7 M/UL (ref 4.2–5.4)
WBC # BLD AUTO: 7.4 K/UL (ref 4.8–10.8)

## 2025-06-30 PROCEDURE — 85027 COMPLETE CBC AUTOMATED: CPT

## 2025-06-30 RX ORDER — PHENTERMINE HYDROCHLORIDE 37.5 MG/1
37.5 TABLET ORAL
COMMUNITY
Start: 2025-06-23

## 2025-06-30 RX ORDER — ADRENAL CORTEX, GLAND (BOVINE) 150MG-50MG
1 CAPSULE ORAL DAILY
COMMUNITY
Start: 2025-06-13

## 2025-06-30 NOTE — DISCHARGE INSTRUCTIONS
The day before surgery you will receive a phone call from the surgery nurse to let you know what time to arrive on the day of surgery. This call will usually be between 2-4 PM. If you do not receive a phone call by 4 PM the day before your surgery please call [phone] and let them know you have not received an arrival time. If your surgery is on Monday, your call will be on the Friday before your Monday surgery. Please check your voicemail as they may leave a message with that information.  If you are running late or wake up sick the day of surgery please call the above number for further instructions.      The morning of surgery, you may take all your prescribed medications with a sip of water unless instructed not to take.  Any exceptions to this would be listed below:  Always follow your surgeon or providers instructions on taking blood thinners.    HOLD your phentermine for 5 days prior to surgery.      PREOPERATIVE GUIDELINES WHEN RECEIVING ANESTHESIA    Do not eat anything after midnight the night before your surgery. You may have water up to 2 hours before your arrival time. No gum or candy the morning of surgery.  This is extremely important for your safety.    Take a bath (or shower) the night before your surgery and you may brush your teeth the morning of your surgery.    Morning of surgery no Nicotine products including smoking, vaping, patches, dip or snuff.     You will be scheduled to arrive at the hospital 2 hours before your surgery, or follow your surgeon's instructions.    Dress comfortably.  Wear loose clothing that will be easy to remove and comfortable for your trip home.    You may wear eyeglasses but bring your cases with you as they must be remove before your surgery. If you wear contacts they will have to be removed before your surgery.    Hearing aids and dentures will need to be removed before your surgery. If you wear dentures, do not glue them in the morning of surgery.     Do not

## 2025-07-03 ENCOUNTER — RESULTS FOLLOW-UP (OUTPATIENT)
Dept: OBGYN CLINIC | Age: 45
End: 2025-07-03

## 2025-07-07 ENCOUNTER — TELEPHONE (OUTPATIENT)
Dept: OBGYN CLINIC | Age: 45
End: 2025-07-07

## 2025-07-07 DIAGNOSIS — Z01.818 PRE-OP EVALUATION: Primary | ICD-10-CM

## 2025-07-07 RX ORDER — MISOPROSTOL 200 UG/1
200 TABLET ORAL ONCE
Qty: 1 TABLET | Refills: 0 | Status: ON HOLD
Start: 2025-07-07 | End: 2025-07-08 | Stop reason: HOSPADM

## 2025-07-07 RX ORDER — MISOPROSTOL 100 UG/1
TABLET ORAL
Qty: 1 TABLET | Refills: 0 | Status: SHIPPED
Start: 2025-07-07 | End: 2025-07-07

## 2025-07-07 NOTE — TELEPHONE ENCOUNTER
Yolande stated that she is having surgery tomorrow and her medication insert has not been called into her pharmacy for her to take today. Please contact patient to advise.     Thank you.

## 2025-07-08 ENCOUNTER — ANESTHESIA EVENT (OUTPATIENT)
Dept: OPERATING ROOM | Age: 45
End: 2025-07-08
Payer: COMMERCIAL

## 2025-07-08 ENCOUNTER — HOSPITAL ENCOUNTER (OUTPATIENT)
Age: 45
Setting detail: OUTPATIENT SURGERY
Discharge: HOME | End: 2025-07-08
Attending: OBSTETRICS & GYNECOLOGY | Admitting: OBSTETRICS & GYNECOLOGY
Payer: COMMERCIAL

## 2025-07-08 ENCOUNTER — ANESTHESIA (OUTPATIENT)
Dept: OPERATING ROOM | Age: 45
End: 2025-07-08
Payer: COMMERCIAL

## 2025-07-08 VITALS
SYSTOLIC BLOOD PRESSURE: 107 MMHG | HEART RATE: 66 BPM | TEMPERATURE: 98.5 F | HEIGHT: 65 IN | BODY MASS INDEX: 26.82 KG/M2 | RESPIRATION RATE: 16 BRPM | OXYGEN SATURATION: 100 % | WEIGHT: 161 LBS | DIASTOLIC BLOOD PRESSURE: 78 MMHG

## 2025-07-08 DIAGNOSIS — N85.2 HYPERTROPHY OF UTERUS: ICD-10-CM

## 2025-07-08 DIAGNOSIS — Z98.890 S/P D&C (STATUS POST DILATION AND CURETTAGE): Primary | ICD-10-CM

## 2025-07-08 DIAGNOSIS — N93.9 ABNORMAL UTERINE BLEEDING (AUB): ICD-10-CM

## 2025-07-08 LAB
HCG, URINE, POC: NEGATIVE
Lab: NORMAL
NEGATIVE QC PASS/FAIL: NORMAL
POSITIVE QC PASS/FAIL: NORMAL

## 2025-07-08 PROCEDURE — 3600000004 HC SURGERY LEVEL 4 BASE: Performed by: OBSTETRICS & GYNECOLOGY

## 2025-07-08 PROCEDURE — 88305 TISSUE EXAM BY PATHOLOGIST: CPT

## 2025-07-08 PROCEDURE — 3700000001 HC ADD 15 MINUTES (ANESTHESIA): Performed by: OBSTETRICS & GYNECOLOGY

## 2025-07-08 PROCEDURE — 7100000010 HC PHASE II RECOVERY - FIRST 15 MIN: Performed by: OBSTETRICS & GYNECOLOGY

## 2025-07-08 PROCEDURE — 3600000014 HC SURGERY LEVEL 4 ADDTL 15MIN: Performed by: OBSTETRICS & GYNECOLOGY

## 2025-07-08 PROCEDURE — 6360000002 HC RX W HCPCS

## 2025-07-08 PROCEDURE — 6370000000 HC RX 637 (ALT 250 FOR IP): Performed by: OBSTETRICS & GYNECOLOGY

## 2025-07-08 PROCEDURE — 2709999900 HC NON-CHARGEABLE SUPPLY: Performed by: OBSTETRICS & GYNECOLOGY

## 2025-07-08 PROCEDURE — 6360000002 HC RX W HCPCS: Performed by: ANESTHESIOLOGY

## 2025-07-08 PROCEDURE — 2500000003 HC RX 250 WO HCPCS: Performed by: ANESTHESIOLOGY

## 2025-07-08 PROCEDURE — 7100000000 HC PACU RECOVERY - FIRST 15 MIN: Performed by: OBSTETRICS & GYNECOLOGY

## 2025-07-08 PROCEDURE — 3700000000 HC ANESTHESIA ATTENDED CARE: Performed by: OBSTETRICS & GYNECOLOGY

## 2025-07-08 PROCEDURE — 7100000001 HC PACU RECOVERY - ADDTL 15 MIN: Performed by: OBSTETRICS & GYNECOLOGY

## 2025-07-08 PROCEDURE — 7100000011 HC PHASE II RECOVERY - ADDTL 15 MIN: Performed by: OBSTETRICS & GYNECOLOGY

## 2025-07-08 PROCEDURE — 2720000010 HC SURG SUPPLY STERILE: Performed by: OBSTETRICS & GYNECOLOGY

## 2025-07-08 PROCEDURE — 2580000003 HC RX 258: Performed by: OBSTETRICS & GYNECOLOGY

## 2025-07-08 RX ORDER — ONDANSETRON 2 MG/ML
4 INJECTION INTRAMUSCULAR; INTRAVENOUS
Status: DISCONTINUED | OUTPATIENT
Start: 2025-07-08 | End: 2025-07-08 | Stop reason: HOSPADM

## 2025-07-08 RX ORDER — SODIUM CHLORIDE 0.9 % (FLUSH) 0.9 %
5-40 SYRINGE (ML) INJECTION PRN
Status: DISCONTINUED | OUTPATIENT
Start: 2025-07-08 | End: 2025-07-08 | Stop reason: HOSPADM

## 2025-07-08 RX ORDER — DEXAMETHASONE SODIUM PHOSPHATE 4 MG/ML
4 INJECTION, SOLUTION INTRA-ARTICULAR; INTRALESIONAL; INTRAMUSCULAR; INTRAVENOUS; SOFT TISSUE ONCE
Status: COMPLETED | OUTPATIENT
Start: 2025-07-08 | End: 2025-07-08

## 2025-07-08 RX ORDER — MIDAZOLAM HYDROCHLORIDE 1 MG/ML
INJECTION, SOLUTION INTRAMUSCULAR; INTRAVENOUS
Status: DISCONTINUED | OUTPATIENT
Start: 2025-07-08 | End: 2025-07-08 | Stop reason: SDUPTHER

## 2025-07-08 RX ORDER — LIDOCAINE HYDROCHLORIDE 10 MG/ML
INJECTION, SOLUTION INFILTRATION; PERINEURAL
Status: DISCONTINUED | OUTPATIENT
Start: 2025-07-08 | End: 2025-07-08 | Stop reason: SDUPTHER

## 2025-07-08 RX ORDER — ACETAMINOPHEN AND CODEINE PHOSPHATE 300; 30 MG/1; MG/1
1 TABLET ORAL EVERY 6 HOURS PRN
Qty: 20 TABLET | Refills: 0 | Status: SHIPPED | OUTPATIENT
Start: 2025-07-08 | End: 2025-07-13

## 2025-07-08 RX ORDER — ONDANSETRON 2 MG/ML
INJECTION INTRAMUSCULAR; INTRAVENOUS
Status: DISCONTINUED | OUTPATIENT
Start: 2025-07-08 | End: 2025-07-08 | Stop reason: SDUPTHER

## 2025-07-08 RX ORDER — SODIUM CHLORIDE 9 MG/ML
INJECTION, SOLUTION INTRAVENOUS PRN
Status: DISCONTINUED | OUTPATIENT
Start: 2025-07-08 | End: 2025-07-08 | Stop reason: HOSPADM

## 2025-07-08 RX ORDER — HYDROMORPHONE HYDROCHLORIDE 1 MG/ML
0.25 INJECTION, SOLUTION INTRAMUSCULAR; INTRAVENOUS; SUBCUTANEOUS EVERY 5 MIN PRN
Status: DISCONTINUED | OUTPATIENT
Start: 2025-07-08 | End: 2025-07-08 | Stop reason: HOSPADM

## 2025-07-08 RX ORDER — PROPOFOL 10 MG/ML
INJECTION, EMULSION INTRAVENOUS
Status: DISCONTINUED | OUTPATIENT
Start: 2025-07-08 | End: 2025-07-08 | Stop reason: SDUPTHER

## 2025-07-08 RX ORDER — SODIUM CHLORIDE 0.9 % (FLUSH) 0.9 %
5-40 SYRINGE (ML) INJECTION EVERY 12 HOURS SCHEDULED
Status: DISCONTINUED | OUTPATIENT
Start: 2025-07-08 | End: 2025-07-08 | Stop reason: HOSPADM

## 2025-07-08 RX ORDER — DEXAMETHASONE SODIUM PHOSPHATE 10 MG/ML
INJECTION, SOLUTION INTRAMUSCULAR; INTRAVENOUS
Status: DISCONTINUED | OUTPATIENT
Start: 2025-07-08 | End: 2025-07-08 | Stop reason: SDUPTHER

## 2025-07-08 RX ORDER — SODIUM CHLORIDE, SODIUM LACTATE, POTASSIUM CHLORIDE, CALCIUM CHLORIDE 600; 310; 30; 20 MG/100ML; MG/100ML; MG/100ML; MG/100ML
INJECTION, SOLUTION INTRAVENOUS CONTINUOUS
Status: DISCONTINUED | OUTPATIENT
Start: 2025-07-08 | End: 2025-07-08 | Stop reason: HOSPADM

## 2025-07-08 RX ORDER — HYDROCODONE BITARTRATE AND ACETAMINOPHEN 5; 325 MG/1; MG/1
1 TABLET ORAL ONCE
Status: COMPLETED | OUTPATIENT
Start: 2025-07-08 | End: 2025-07-08

## 2025-07-08 RX ORDER — HYDROMORPHONE HYDROCHLORIDE 1 MG/ML
0.5 INJECTION, SOLUTION INTRAMUSCULAR; INTRAVENOUS; SUBCUTANEOUS EVERY 5 MIN PRN
Status: DISCONTINUED | OUTPATIENT
Start: 2025-07-08 | End: 2025-07-08 | Stop reason: HOSPADM

## 2025-07-08 RX ORDER — IBUPROFEN 800 MG/1
800 TABLET, FILM COATED ORAL EVERY 8 HOURS PRN
Qty: 60 TABLET | Refills: 0 | Status: SHIPPED | OUTPATIENT
Start: 2025-07-08

## 2025-07-08 RX ORDER — FENTANYL CITRATE 50 UG/ML
INJECTION, SOLUTION INTRAMUSCULAR; INTRAVENOUS
Status: DISCONTINUED | OUTPATIENT
Start: 2025-07-08 | End: 2025-07-08 | Stop reason: SDUPTHER

## 2025-07-08 RX ORDER — KETOROLAC TROMETHAMINE 30 MG/ML
INJECTION, SOLUTION INTRAMUSCULAR; INTRAVENOUS
Status: DISCONTINUED | OUTPATIENT
Start: 2025-07-08 | End: 2025-07-08 | Stop reason: SDUPTHER

## 2025-07-08 RX ADMIN — HYDROCODONE BITARTRATE AND ACETAMINOPHEN 1 TABLET: 5; 325 TABLET ORAL at 17:14

## 2025-07-08 RX ADMIN — ONDANSETRON 4 MG: 2 INJECTION INTRAMUSCULAR; INTRAVENOUS at 16:05

## 2025-07-08 RX ADMIN — FENTANYL CITRATE 100 MCG: 0.05 INJECTION, SOLUTION INTRAMUSCULAR; INTRAVENOUS at 15:36

## 2025-07-08 RX ADMIN — SODIUM CHLORIDE, SODIUM LACTATE, POTASSIUM CHLORIDE, AND CALCIUM CHLORIDE: .6; .31; .03; .02 INJECTION, SOLUTION INTRAVENOUS at 12:04

## 2025-07-08 RX ADMIN — SODIUM CHLORIDE, PRESERVATIVE FREE 20 MG: 5 INJECTION INTRAVENOUS at 13:59

## 2025-07-08 RX ADMIN — KETOROLAC TROMETHAMINE 30 MG: 30 INJECTION, SOLUTION INTRAMUSCULAR; INTRAVENOUS at 16:07

## 2025-07-08 RX ADMIN — MIDAZOLAM 2 MG: 1 INJECTION INTRAMUSCULAR; INTRAVENOUS at 15:34

## 2025-07-08 RX ADMIN — LIDOCAINE HYDROCHLORIDE 50 MG: 10 INJECTION, SOLUTION INFILTRATION; PERINEURAL at 15:41

## 2025-07-08 RX ADMIN — PROPOFOL 150 MG: 10 INJECTION, EMULSION INTRAVENOUS at 15:41

## 2025-07-08 RX ADMIN — DEXAMETHASONE SODIUM PHOSPHATE 4 MG: 4 INJECTION INTRA-ARTICULAR; INTRALESIONAL; INTRAMUSCULAR; INTRAVENOUS; SOFT TISSUE at 13:59

## 2025-07-08 RX ADMIN — DEXAMETHASONE SODIUM PHOSPHATE 10 MG: 10 INJECTION, SOLUTION INTRAMUSCULAR; INTRAVENOUS at 15:49

## 2025-07-08 ASSESSMENT — ENCOUNTER SYMPTOMS
ALLERGIC/IMMUNOLOGIC NEGATIVE: 1
GASTROINTESTINAL NEGATIVE: 1
RESPIRATORY NEGATIVE: 1
EYES NEGATIVE: 1

## 2025-07-08 ASSESSMENT — PAIN SCALES - GENERAL: PAINLEVEL_OUTOF10: 4

## 2025-07-08 ASSESSMENT — PAIN - FUNCTIONAL ASSESSMENT: PAIN_FUNCTIONAL_ASSESSMENT: NONE - DENIES PAIN

## 2025-07-08 ASSESSMENT — LIFESTYLE VARIABLES: SMOKING_STATUS: 1

## 2025-07-08 NOTE — ANESTHESIA POSTPROCEDURE EVALUATION
Department of Anesthesiology  Postprocedure Note    Patient: Yolande Reyna  MRN: [id number]  YOB: 1980  Date of evaluation: 7/8/2025    Procedure Summary       Date: 07/08/25 Room / Location: [address]    Anesthesia Start: 1536 Anesthesia Stop:     Procedure: HYSTEROSCOPY DILATATION AND CURETTAGE MYOSURE Diagnosis:       Abnormal uterine bleeding (AUB)      Hypertrophy of uterus      (Abnormal uterine bleeding (AUB) [N93.9])      (Hypertrophy of uterus [N85.2])    Surgeons: Amy Arthur MD Responsible Provider: Barbara Braxton APRN - CRNA    Anesthesia Type: General ASA Status: 2            Anesthesia Type: General    Monique Phase I: Monique Score: 9    Monique Phase II:      Anesthesia Post Evaluation    Patient location during evaluation: PACU  Patient participation: complete - patient participated  Level of consciousness: awake  Pain score: 0  Airway patency: patent  Nausea & Vomiting: no nausea and no vomiting  Cardiovascular status: hemodynamically stable  Respiratory status: spontaneous ventilation and room air  Hydration status: stable  Comments: /78   Pulse 66   Temp 97 °F (36.1 °C) (Temporal)   Resp 19    SpO2 98%    VSS, report given to siddharth TOSCANO     Pain management: adequate    No notable events documented.

## 2025-07-08 NOTE — DISCHARGE INSTRUCTIONS
No sexual intercourse for 2 weeks. Nothing in the vagina for 2 weeks: no tampons, no bath, no douche. Call MD for fever greater than 101, foul-smelling vaginal discharge, or any questions or concerns.   Him/He

## 2025-07-08 NOTE — H&P
HPI  Yolande Reyna is a 45 y.o. female with AUB who presents for Hysteroscopy, Myosure.      Review of Systems   Constitutional: Negative.    HENT: Negative.     Eyes: Negative.    Respiratory: Negative.     Cardiovascular: Negative.    Gastrointestinal: Negative.    Endocrine: Negative.    Genitourinary:  Positive for menstrual problem.   Musculoskeletal: Negative.    Skin: Negative.    Allergic/Immunologic: Negative.    Neurological: Negative.    Hematological: Negative.    Psychiatric/Behavioral: Negative.         Past Medical History:   Diagnosis Date    Brett-Danlos syndrome     Headache     Mitral valve prolapse     Thyroid disease      Past Surgical History:   Procedure Laterality Date    CERVICAL SPINE SURGERY N/A 11/12/2019    CERVICAL INTERLAMINAR SELIN C5-6 performed by Ben Valdez at [facility] OR    CERVICAL SPINE SURGERY N/A 03/17/2020    CERVICAL INTERLAMINAR SELIN C6-7 performed by Ben Valdez at [facility] OR    COLONOSCOPY N/A 06/17/2025    Duplicate    COLONOSCOPY N/A 06/17/2025    Dr Garcia, Benign HP, mild diverticulosis left colon, sm perianal tags on the retroflexed exam in rectum, 5 year recall    DILATION AND CURETTAGE OF UTERUS      EPIDURAL STEROID INJECTION N/A 12/17/2019    EPIDURAL STEROID INJECTION C6-7 performed by Ben Valdez at [facility] OR    ESOPHAGEAL DILATATION N/A 12/04/2024    Dr Garcia, Benítez 54 fr dil    FACIAL SURGERY      reconstruction, has metal in face     NOSE SURGERY Bilateral 03/14/2025    Turbinate reduction. performed by Chad Paz MD at [facility] OR    SINUS SURGERY      2004    TONSILLECTOMY      UPPER GASTROINTESTINAL ENDOSCOPY N/A 12/04/2024    Dr Garcia, W 54 fr dil, (-) EOE, (-) h pylori, sugg of mod chemical gastritis,     Family History   Problem Relation Age of Onset    Colon Polyps Mother     Colon Polyps Father     Crohn's Disease Father     Crohn's Disease Paternal Aunt     Crohn's Disease Paternal Aunt     Colon Cancer

## 2025-07-09 NOTE — OP NOTE
Operative Note      Patient: Yolande Reyna  YOB: 1980  MRN: [id number]    Date of Procedure: 7/8/2025    Pre-Op Diagnosis Codes:      * Abnormal uterine bleeding (AUB) [N93.9]     * Hypertrophy of uterus [N85.2]    Post-Op Diagnosis: Same       Procedure(s):  HYSTEROSCOPY DILATATION AND CURETTAGE MYOSURE    Surgeon(s):  Amy Arthur MD    Assistant:   * No surgical staff found *    Anesthesia: General    Estimated Blood Loss (mL): Minimal    Complications: None    Specimens:   ID Type Source Tests Collected by Time Destination   A : Endometrial curretings Tissue Endometrium SURGICAL PATHOLOGY Amy Arthur MD 7/8/2025 1558        Implants:  * No implants in log *      Drains: * No LDAs found *    Findings:  Infection Present At Time Of Surgery (PATOS) (choose all levels that have infection present):  No infection present  Other Findings: Large, parous cervix.  Large uterine cavity with thickened endometrium.      Detailed Description of Procedure:   The patient was taken to the operating room where she was placed under general anesthesia.  She was positioned in dorsal lithotomy, prepped and draped in usual sterile fashion.  A weighted speculum was placed into the vagina and the anterior lip of the cervix was grasped with a single-tooth tenaculum.  The os was serially dilated to accommodate the hysteroscope which was placed without complication and the above findings were noted.  Directed biopsies were taken throughout the cavity using the Myosure Lite.  At this time, the hysteroscope was removed and sharp curettage was performed.  At this time, the tenaculum was removed from the anterior lip of the cervix.  Monsel's was placed over the tenaculum sites and surface of the cervix.  The procedure was concluded.  The patient was awakened in the operating room and brought to PACU in stable condition.    Electronically signed by Amy Uriostegui MD on 7/8/2025 at 8:25

## 2025-07-14 NOTE — PROGRESS NOTES
daily     • VITAMIN D, CHOLECALCIFEROL, PO Take 5,000 Units by mouth daily     • levocetirizine (XYZAL) 5 MG tablet Take 1 tablet by mouth daily     • butalbital-acetaminophen-caffeine (FIORICET, ESGIC) -40 MG per tablet Take 1 tablet by mouth every 4 hours as needed for Headaches     • levothyroxine (SYNTHROID) 75 MCG tablet Take 1 tablet by mouth Daily     • cyanocobalamin 1000 MCG/ML injection Inject 1 mL into the muscle every 14 days     • ALPRAZolam (XANAX PO) Take 0.25 mg by mouth as needed (anxiety)     • busPIRone HCl (BUSPAR PO) Take 10 mg by mouth daily     • PRASTERONE, DHEA, PO Apply 1 each topically daily PG40MG/VFYU7BB/T0.2MG/0.25ML  APPLY  1 CLICK (0.25 ML) TO LABIA AT BEDTIME (Patient not taking: Reported on 7/15/2025)       No current facility-administered medications for this visit.       Allergies   Allergen Reactions   • Adhesive Tape Rash     Can use paper tape   • Fish Allergy Nausea And Vomiting     catfish           Objective:     /80   Pulse 76   Ht 1.651 m (5' 5\")   Wt 74.8 kg (165 lb)   LMP 06/26/2025   SpO2 97%   BMI 27.46 kg/m²     Physical Exam    Physical Exam  Vitals reviewed.   Constitutional:       General: She is not in acute distress.     Appearance: Normal appearance. She is normal weight. She is not ill-appearing.   HENT:      Head: Normocephalic and atraumatic.      Right Ear: External ear normal.      Left Ear: External ear normal.      Nose: Nose normal.      Mouth/Throat:      Mouth: Mucous membranes are moist.   Eyes:      Conjunctiva/sclera: Conjunctivae normal.   Cardiovascular:      Rate and Rhythm: Normal rate and regular rhythm.      Pulses: Normal pulses.      Heart sounds: Normal heart sounds.   Pulmonary:      Effort: Pulmonary effort is normal. No respiratory distress.      Breath sounds: Normal breath sounds.   Abdominal:      General: Abdomen is flat. Bowel sounds are normal.      Palpations: Abdomen is soft.   Musculoskeletal:         General:

## 2025-07-15 ENCOUNTER — OFFICE VISIT (OUTPATIENT)
Dept: GASTROENTEROLOGY | Age: 45
End: 2025-07-15
Payer: COMMERCIAL

## 2025-07-15 VITALS
WEIGHT: 165 LBS | BODY MASS INDEX: 27.49 KG/M2 | SYSTOLIC BLOOD PRESSURE: 115 MMHG | OXYGEN SATURATION: 97 % | HEART RATE: 76 BPM | DIASTOLIC BLOOD PRESSURE: 80 MMHG | HEIGHT: 65 IN

## 2025-07-15 DIAGNOSIS — K57.90 DIVERTICULOSIS: ICD-10-CM

## 2025-07-15 DIAGNOSIS — K21.9 GASTROESOPHAGEAL REFLUX DISEASE WITHOUT ESOPHAGITIS: ICD-10-CM

## 2025-07-15 DIAGNOSIS — K63.5 HYPERPLASTIC POLYP OF TRANSVERSE COLON: Primary | ICD-10-CM

## 2025-07-15 DIAGNOSIS — K64.4 SKIN TAG OF PERIANAL REGION: ICD-10-CM

## 2025-07-15 PROCEDURE — 99213 OFFICE O/P EST LOW 20 MIN: CPT

## 2025-07-15 RX ORDER — PANTOPRAZOLE SODIUM 40 MG/1
40 TABLET, DELAYED RELEASE ORAL
Qty: 30 TABLET | Refills: 11 | Status: SHIPPED | OUTPATIENT
Start: 2025-07-15

## 2025-07-15 ASSESSMENT — ENCOUNTER SYMPTOMS
ALLERGIC/IMMUNOLOGIC NEGATIVE: 1
NAUSEA: 0
CONSTIPATION: 0
ABDOMINAL PAIN: 0
RESPIRATORY NEGATIVE: 1
VOMITING: 0
DIARRHEA: 0
CHOKING: 0
EYES NEGATIVE: 1
GASTROINTESTINAL NEGATIVE: 1
TROUBLE SWALLOWING: 0

## 2025-07-22 ENCOUNTER — OFFICE VISIT (OUTPATIENT)
Dept: ENT CLINIC | Age: 45
End: 2025-07-22
Payer: COMMERCIAL

## 2025-07-22 VITALS
HEIGHT: 65 IN | SYSTOLIC BLOOD PRESSURE: 116 MMHG | WEIGHT: 160 LBS | DIASTOLIC BLOOD PRESSURE: 72 MMHG | BODY MASS INDEX: 26.66 KG/M2

## 2025-07-22 DIAGNOSIS — R20.0 NUMBNESS OF TONGUE: Primary | ICD-10-CM

## 2025-07-22 PROCEDURE — 99214 OFFICE O/P EST MOD 30 MIN: CPT | Performed by: OTOLARYNGOLOGY

## 2025-07-22 RX ORDER — PREDNISONE 20 MG/1
TABLET ORAL
Qty: 21 TABLET | Refills: 0 | Status: SHIPPED | OUTPATIENT
Start: 2025-07-22

## 2025-07-22 ASSESSMENT — ENCOUNTER SYMPTOMS
ALLERGIC/IMMUNOLOGIC NEGATIVE: 1
RESPIRATORY NEGATIVE: 1
GASTROINTESTINAL NEGATIVE: 1
EYES NEGATIVE: 1

## 2025-07-22 NOTE — PROGRESS NOTES
2025    Yolande Reyna (:  1980) is a 45 y.o. female, Established patient, here for evaluation of the following chief complaint(s):  Follow-up (Tongue and throat numbness)      Vitals:    25 1128   BP: 116/72   Weight: 72.6 kg (160 lb)   Height: 1.651 m (5' 5\")       Wt Readings from Last 3 Encounters:   25 72.6 kg (160 lb)   07/15/25 74.8 kg (165 lb)   25 73 kg (161 lb)       BP Readings from Last 3 Encounters:   25 116/72   07/15/25 115/80   25 107/78         SUBJECTIVE/OBJECTIVE:    Patient seen today for her tongue.  She had a procedure earlier in July with an LMA and since that time she has had numbness of her tongue and the right side of her throat.  The tongue is still numb and her taste is off as well.  She was told there are no complications during the procedure.        Review of Systems   Constitutional: Negative.    HENT: Negative.          Tongue numbness   Eyes: Negative.    Respiratory: Negative.     Cardiovascular: Negative.    Gastrointestinal: Negative.    Endocrine: Negative.    Musculoskeletal: Negative.    Skin: Negative.    Allergic/Immunologic: Negative.    Neurological: Negative.    Hematological: Negative.    Psychiatric/Behavioral: Negative.          Physical Exam  Vitals reviewed.   Constitutional:       Appearance: Normal appearance. She is normal weight.   HENT:      Head: Normocephalic and atraumatic.      Right Ear: Tympanic membrane, ear canal and external ear normal.      Left Ear: Tympanic membrane, ear canal and external ear normal.      Nose: Nose normal.      Mouth/Throat:      Mouth: Mucous membranes are moist.      Pharynx: Oropharynx is clear.   Eyes:      Extraocular Movements: Extraocular movements intact.      Pupils: Pupils are equal, round, and reactive to light.   Cardiovascular:      Rate and Rhythm: Normal rate and regular rhythm.   Pulmonary:      Effort: Pulmonary effort is normal.      Breath sounds: Normal breath

## 2025-08-04 ENCOUNTER — OFFICE VISIT (OUTPATIENT)
Dept: OBGYN CLINIC | Age: 45
End: 2025-08-04
Payer: COMMERCIAL

## 2025-08-04 VITALS
DIASTOLIC BLOOD PRESSURE: 77 MMHG | WEIGHT: 165 LBS | BODY MASS INDEX: 27.49 KG/M2 | HEART RATE: 72 BPM | SYSTOLIC BLOOD PRESSURE: 110 MMHG | HEIGHT: 65 IN

## 2025-08-04 DIAGNOSIS — Z98.890 S/P D&C (STATUS POST DILATION AND CURETTAGE): Primary | ICD-10-CM

## 2025-08-04 DIAGNOSIS — R53.82 CHRONIC FATIGUE: ICD-10-CM

## 2025-08-04 DIAGNOSIS — R79.89 ELEVATED PARATHYROID HORMONE: ICD-10-CM

## 2025-08-04 PROCEDURE — 99214 OFFICE O/P EST MOD 30 MIN: CPT | Performed by: OBSTETRICS & GYNECOLOGY

## 2025-08-05 ENCOUNTER — HOSPITAL ENCOUNTER (OUTPATIENT)
Dept: ULTRASOUND IMAGING | Age: 45
Discharge: HOME | End: 2025-08-05
Attending: OBSTETRICS & GYNECOLOGY
Payer: COMMERCIAL

## 2025-08-05 DIAGNOSIS — R53.82 CHRONIC FATIGUE: ICD-10-CM

## 2025-08-05 DIAGNOSIS — R79.89 ELEVATED PARATHYROID HORMONE: ICD-10-CM

## 2025-08-05 PROCEDURE — 76536 US EXAM OF HEAD AND NECK: CPT

## 2025-08-06 ENCOUNTER — OFFICE VISIT (OUTPATIENT)
Dept: ENT CLINIC | Age: 45
End: 2025-08-06
Payer: COMMERCIAL

## 2025-08-06 VITALS — SYSTOLIC BLOOD PRESSURE: 118 MMHG | DIASTOLIC BLOOD PRESSURE: 82 MMHG

## 2025-08-06 DIAGNOSIS — R53.83 OTHER FATIGUE: Primary | ICD-10-CM

## 2025-08-06 PROCEDURE — 99213 OFFICE O/P EST LOW 20 MIN: CPT | Performed by: OTOLARYNGOLOGY

## 2025-08-06 ASSESSMENT — ENCOUNTER SYMPTOMS
ALLERGIC/IMMUNOLOGIC NEGATIVE: 1
EYES NEGATIVE: 1
GASTROINTESTINAL NEGATIVE: 1
RESPIRATORY NEGATIVE: 1

## 2025-08-07 LAB
C3 SERPL-MCNC: 119 MG/DL (ref 90–180)
C4 SERPL-MCNC: 24 MG/DL (ref 10–40)
NUCLEAR IGG SER QL IA: NORMAL
RHEUMATOID FACT SER NEPH-ACNC: <10 IU/ML (ref 0–14)

## 2025-09-04 ENCOUNTER — HOSPITAL ENCOUNTER (OUTPATIENT)
Dept: PHYSICAL THERAPY | Age: 45
Setting detail: THERAPIES SERIES
Discharge: HOME | End: 2025-09-04
Payer: COMMERCIAL

## 2025-09-04 PROCEDURE — 97163 PT EVAL HIGH COMPLEX 45 MIN: CPT

## (undated) DEVICE — SUPPLEMENT DIGESTIVE H2O SOL GI-EASE

## (undated) DEVICE — GLOVE ORANGE PI 7 1/2   MSG9075

## (undated) DEVICE — COVER,MAYO STAND,STERILE: Brand: MEDLINE

## (undated) DEVICE — Device

## (undated) DEVICE — ADAPTER CLEANING PORPOISE CLEANING

## (undated) DEVICE — NERVE BLOCK TRAY (FACET)-LF: Brand: MEDLINE INDUSTRIES, INC.

## (undated) DEVICE — SNARE POLYP SM W13MMXL240CM SHTH DIA2.4MM OVL FLX DISP

## (undated) DEVICE — FLUID MGMT SYS FLUENT KIT 6/PK

## (undated) DEVICE — CLEANING SPONGE: Brand: KOALA™

## (undated) DEVICE — FORCEPS BX 240CM 2.4MM L NDL RAD JAW 4 M00513334

## (undated) DEVICE — GAUZE,SPONGE,4"X4",8PLY,STRL,LF,10/TRAY: Brand: MEDLINE

## (undated) DEVICE — SEAL ENDO INSTR SELF SEAL UROLOGY

## (undated) DEVICE — TUBING, SUCTION, 1/4" X 20', STRAIGHT: Brand: MEDLINE INDUSTRIES, INC.

## (undated) DEVICE — AMBU AURA-I U SIZE 3, DISPOSABLE LARYNGEAL MASK: Brand: AURA-I

## (undated) DEVICE — CANNULA NSL AD L7FT DIV O2 CO2 W/ M LUERLOCK TRMPT CONN

## (undated) DEVICE — ELECTRODE ELECSURG 2 PLATE AD 10 FT 33 LB PT RET MEGADYNE

## (undated) DEVICE — SOLUTION IRRIG 3000ML 0.9% SOD CHL USP UROMATIC PLAS CONT

## (undated) DEVICE — BITE BLOCK ENDOSCP AD 60 FR W/ ADJ STRP PLAS GRN BLOX

## (undated) DEVICE — TOWEL,OR,DSP,ST,BLUE,DLX,4/PK,20PK/CS: Brand: MEDLINE

## (undated) DEVICE — DEVICE TISS REM DIA3MM L25.25IN ENDOSCP F/ IU POLYPS

## (undated) DEVICE — SINGLE PORT MANIFOLD: Brand: NEPTUNE 2

## (undated) DEVICE — SYRINGE 20ML LL S/C 50

## (undated) DEVICE — PITCHER PT 1200ML W HNDL CSR WRP

## (undated) DEVICE — BRUSH ENDOSCP 2 END CHN HEDGEHOG

## (undated) DEVICE — SPONGE,NEURO,0.5"X3",XR,STRL,LF,10/PK: Brand: MEDLINE

## (undated) DEVICE — SYRINGE MED 10ML TRNSLUC BRL PLUNG BLK MRK POLYPR CTRL

## (undated) DEVICE — GLOVE SURG SZ 75 CRM LTX FREE POLYISOPRENE POLYMER BEAD ANTI

## (undated) DEVICE — TRAP,MUCUS SPECIMEN,40CC: Brand: MEDLINE

## (undated) DEVICE — CLEANING BRUSH - SINGLE USE: Brand: SAFEGUIDE®

## (undated) DEVICE — SET ENDOSCP SEAL HYSTEROSCOPE RIG OUTFLO CHN DISP MYOSURE

## (undated) DEVICE — REFLEX ULTRA 45 WITH INTEGRATED CABLE: Brand: COBLATION

## (undated) DEVICE — ENDO KIT,LOURDES HOSPITAL: Brand: MEDLINE INDUSTRIES, INC.